# Patient Record
Sex: FEMALE | Race: WHITE | NOT HISPANIC OR LATINO | Employment: OTHER | URBAN - METROPOLITAN AREA
[De-identification: names, ages, dates, MRNs, and addresses within clinical notes are randomized per-mention and may not be internally consistent; named-entity substitution may affect disease eponyms.]

---

## 2017-01-01 ENCOUNTER — TRANSCRIBE ORDERS (OUTPATIENT)
Dept: ADMINISTRATIVE | Facility: HOSPITAL | Age: 72
End: 2017-01-01

## 2017-01-01 ENCOUNTER — OFFICE VISIT (OUTPATIENT)
Dept: CARDIOLOGY | Facility: CLINIC | Age: 72
End: 2017-01-01

## 2017-01-01 ENCOUNTER — HOSPITAL ENCOUNTER (OUTPATIENT)
Dept: CARDIOLOGY | Facility: HOSPITAL | Age: 72
Discharge: HOME | End: 2017-04-25
Admitting: FAMILY MEDICINE

## 2017-01-01 VITALS
HEIGHT: 63 IN | HEART RATE: 64 BPM | BODY MASS INDEX: 37.21 KG/M2 | SYSTOLIC BLOOD PRESSURE: 116 MMHG | DIASTOLIC BLOOD PRESSURE: 68 MMHG | WEIGHT: 210 LBS

## 2017-01-01 DIAGNOSIS — I63.40 CEREBROVASCULAR ACCIDENT (CVA) DUE TO EMBOLISM OF CEREBRAL ARTERY (HCC): Primary | ICD-10-CM

## 2017-01-01 DIAGNOSIS — M79.89 LEFT LEG SWELLING: ICD-10-CM

## 2017-01-01 DIAGNOSIS — M79.89 LEFT LEG SWELLING: Primary | ICD-10-CM

## 2017-01-01 LAB
BH CV LOWER VASCULAR LEFT COMMON FEMORAL AUGMENT: NORMAL
BH CV LOWER VASCULAR LEFT COMMON FEMORAL COMPETENT: NORMAL
BH CV LOWER VASCULAR LEFT COMMON FEMORAL COMPRESS: NORMAL
BH CV LOWER VASCULAR LEFT COMMON FEMORAL PHASIC: NORMAL
BH CV LOWER VASCULAR LEFT COMMON FEMORAL SPONT: NORMAL
BH CV LOWER VASCULAR LEFT DISTAL FEMORAL COMPRESS: NORMAL
BH CV LOWER VASCULAR LEFT GASTRONEMIUS COMPRESS: NORMAL
BH CV LOWER VASCULAR LEFT GREATER SAPH AK COMPRESS: NORMAL
BH CV LOWER VASCULAR LEFT GREATER SAPH BK COMPRESS: NORMAL
BH CV LOWER VASCULAR LEFT MID FEMORAL AUGMENT: NORMAL
BH CV LOWER VASCULAR LEFT MID FEMORAL COMPETENT: NORMAL
BH CV LOWER VASCULAR LEFT MID FEMORAL COMPRESS: NORMAL
BH CV LOWER VASCULAR LEFT MID FEMORAL PHASIC: NORMAL
BH CV LOWER VASCULAR LEFT MID FEMORAL SPONT: NORMAL
BH CV LOWER VASCULAR LEFT PERONEAL COMPRESS: NORMAL
BH CV LOWER VASCULAR LEFT POPLITEAL AUGMENT: NORMAL
BH CV LOWER VASCULAR LEFT POPLITEAL COMPETENT: NORMAL
BH CV LOWER VASCULAR LEFT POPLITEAL COMPRESS: NORMAL
BH CV LOWER VASCULAR LEFT POPLITEAL PHASIC: NORMAL
BH CV LOWER VASCULAR LEFT POPLITEAL SPONT: NORMAL
BH CV LOWER VASCULAR LEFT POSTERIOR TIBIAL COMPRESS: NORMAL
BH CV LOWER VASCULAR LEFT PROXIMAL FEMORAL COMPRESS: NORMAL
BH CV LOWER VASCULAR LEFT SAPHENOFEMORAL JUNCTION AUGMENT: NORMAL
BH CV LOWER VASCULAR LEFT SAPHENOFEMORAL JUNCTION COMPETENT: NORMAL
BH CV LOWER VASCULAR LEFT SAPHENOFEMORAL JUNCTION COMPRESS: NORMAL
BH CV LOWER VASCULAR LEFT SAPHENOFEMORAL JUNCTION PHASIC: NORMAL
BH CV LOWER VASCULAR LEFT SAPHENOFEMORAL JUNCTION SPONT: NORMAL
BH CV LOWER VASCULAR RIGHT COMMON FEMORAL AUGMENT: NORMAL
BH CV LOWER VASCULAR RIGHT COMMON FEMORAL COMPETENT: NORMAL
BH CV LOWER VASCULAR RIGHT COMMON FEMORAL COMPRESS: NORMAL
BH CV LOWER VASCULAR RIGHT COMMON FEMORAL PHASIC: NORMAL
BH CV LOWER VASCULAR RIGHT COMMON FEMORAL SPONT: NORMAL

## 2017-01-01 PROCEDURE — 93000 ELECTROCARDIOGRAM COMPLETE: CPT | Performed by: INTERNAL MEDICINE

## 2017-01-01 PROCEDURE — 99213 OFFICE O/P EST LOW 20 MIN: CPT | Performed by: INTERNAL MEDICINE

## 2017-01-01 PROCEDURE — 93971 EXTREMITY STUDY: CPT

## 2017-01-01 RX ORDER — PROMETHAZINE HYDROCHLORIDE 25 MG/1
25 TABLET ORAL AS NEEDED
COMMUNITY

## 2017-01-01 RX ORDER — OXYCODONE AND ACETAMINOPHEN 7.5; 325 MG/1; MG/1
1-2 TABLET ORAL AS NEEDED
COMMUNITY
Start: 2015-06-27

## 2017-01-01 RX ORDER — GABAPENTIN 300 MG/1
600 CAPSULE ORAL
COMMUNITY
Start: 2017-01-01

## 2017-02-16 ENCOUNTER — TELEPHONE (OUTPATIENT)
Dept: CARDIOLOGY | Facility: CLINIC | Age: 72
End: 2017-02-16

## 2017-02-16 NOTE — TELEPHONE ENCOUNTER
Patient is needing to have 8 teeth extracted and would like to be sedated for this.  They are requesting a clearance that patient is ok to undergo this type of sedation in the office.    Please advise.  Thanks!

## 2017-07-30 NOTE — PROGRESS NOTES
Subjective:     Encounter Date:07/17/2017      Patient ID: Jess Barber is a 72 y.o. female.    Chief Complaint: stroke, leg swelling    History of Present Illness    Dear Dr. Turcios,    I had the pleasure of seeing Jess Barber in cardiac follow up today.  As you know well, she is a miriam 72-year-old woman with history of cryptogenic stroke.  She had a PFO identified as the cause of this and this was closed many years ago.  She had a LAP-BAND surgery years ago and has done well with that.    She comes in for 6 month follow-up.  She's been under a lot of stress due to her 's severe illness.    She had some left leg swelling.  She had 2 Doppler studies that were negative for DVT.  Despite diuretics she still has a little swelling.    Review of Systems   All other systems reviewed and are negative.        ECG 12 Lead  Date/Time: 7/30/2017 4:41 PM  Performed by: KENNY DURBIN  Authorized by: KENNY DURBIN   Comparison: compared with previous ECG   Similar to previous ECG  Rhythm: sinus rhythm  BPM: 64  Clinical impression: normal ECG               Objective:     Physical Exam   Constitutional: She is oriented to person, place, and time. She appears well-developed and well-nourished.   HENT:   Head: Normocephalic and atraumatic.   Neck: Normal range of motion. Neck supple.   Cardiovascular: Normal rate, regular rhythm and normal heart sounds.    Pulmonary/Chest: Effort normal and breath sounds normal.   Abdominal: Soft. Bowel sounds are normal.   Musculoskeletal: Normal range of motion.   Neurological: She is alert and oriented to person, place, and time.   Skin: Skin is warm and dry.   Psychiatric: She has a normal mood and affect. Her behavior is normal. Thought content normal.   Vitals reviewed.      Lab Review:       Assessment:          Diagnosis Plan   1. Cerebrovascular accident (CVA) due to embolism of cerebral artery     2. Left leg swelling            Plan:       It was a pleasure to see your  patient in cardiac follow-up today.  She is doing well from the cardiac standpoint.  She's had no recurrent neurologic events.  She has some minor lotion edema.  I have recommended leg elevation and compression hose.  She will see me again in 6 months or sooner if symptoms warrant.

## 2018-01-01 ENCOUNTER — APPOINTMENT (OUTPATIENT)
Dept: GENERAL RADIOLOGY | Facility: HOSPITAL | Age: 73
End: 2018-01-01

## 2018-01-01 ENCOUNTER — TRANSCRIBE ORDERS (OUTPATIENT)
Dept: ADMINISTRATIVE | Facility: HOSPITAL | Age: 73
End: 2018-01-01

## 2018-01-01 ENCOUNTER — HOSPITAL ENCOUNTER (OUTPATIENT)
Dept: GENERAL RADIOLOGY | Facility: HOSPITAL | Age: 73
Discharge: HOME | End: 2018-01-05

## 2018-01-01 ENCOUNTER — APPOINTMENT (OUTPATIENT)
Dept: GENERAL RADIOLOGY | Facility: HOSPITAL | Age: 73
End: 2018-01-01
Attending: INTERNAL MEDICINE

## 2018-01-01 ENCOUNTER — HOSPITAL ENCOUNTER (INPATIENT)
Facility: HOSPITAL | Age: 73
LOS: 2 days | End: 2018-03-14
Attending: EMERGENCY MEDICINE | Admitting: INTERNAL MEDICINE

## 2018-01-01 ENCOUNTER — APPOINTMENT (OUTPATIENT)
Dept: CARDIOLOGY | Facility: HOSPITAL | Age: 73
End: 2018-01-01
Attending: INTERNAL MEDICINE

## 2018-01-01 ENCOUNTER — APPOINTMENT (OUTPATIENT)
Dept: CT IMAGING | Facility: HOSPITAL | Age: 73
End: 2018-01-01

## 2018-01-01 ENCOUNTER — HOSPITAL ENCOUNTER (OUTPATIENT)
Dept: GENERAL RADIOLOGY | Facility: HOSPITAL | Age: 73
Discharge: HOME | End: 2018-01-31
Admitting: FAMILY MEDICINE

## 2018-01-01 ENCOUNTER — OFFICE VISIT (OUTPATIENT)
Dept: CARDIOLOGY | Facility: CLINIC | Age: 73
End: 2018-01-01

## 2018-01-01 ENCOUNTER — HOSPITAL ENCOUNTER (OUTPATIENT)
Dept: GENERAL RADIOLOGY | Facility: HOSPITAL | Age: 73
Discharge: HOME | End: 2018-01-31

## 2018-01-01 ENCOUNTER — HOSPITAL ENCOUNTER (OUTPATIENT)
Dept: GENERAL RADIOLOGY | Facility: HOSPITAL | Age: 73
Discharge: HOME | End: 2018-01-05
Admitting: FAMILY MEDICINE

## 2018-01-01 VITALS
HEIGHT: 63 IN | BODY MASS INDEX: 34.73 KG/M2 | WEIGHT: 196 LBS | DIASTOLIC BLOOD PRESSURE: 84 MMHG | HEART RATE: 76 BPM | RESPIRATION RATE: 18 BRPM | SYSTOLIC BLOOD PRESSURE: 162 MMHG

## 2018-01-01 VITALS — WEIGHT: 220.24 LBS | BODY MASS INDEX: 39.02 KG/M2 | TEMPERATURE: 100 F | HEIGHT: 63 IN

## 2018-01-01 DIAGNOSIS — Z91.81 PERSONAL HISTORY OF FALL: ICD-10-CM

## 2018-01-01 DIAGNOSIS — R41.82 ALTERED MENTAL STATUS, UNSPECIFIED ALTERED MENTAL STATUS TYPE: ICD-10-CM

## 2018-01-01 DIAGNOSIS — E16.2 HYPOGLYCEMIA: Primary | ICD-10-CM

## 2018-01-01 DIAGNOSIS — M79.672 LEFT FOOT PAIN: ICD-10-CM

## 2018-01-01 DIAGNOSIS — K72.00 ACUTE LIVER FAILURE WITHOUT HEPATIC COMA: ICD-10-CM

## 2018-01-01 DIAGNOSIS — A41.9 SEPTIC SHOCK (HCC): ICD-10-CM

## 2018-01-01 DIAGNOSIS — M25.572 LEFT ANKLE PAIN, UNSPECIFIED CHRONICITY: ICD-10-CM

## 2018-01-01 DIAGNOSIS — S92.302S CLOSED NONDISPLACED FRACTURE OF METATARSAL BONE OF LEFT FOOT, UNSPECIFIED METATARSAL, SEQUELA: ICD-10-CM

## 2018-01-01 DIAGNOSIS — S92.302S CLOSED NONDISPLACED FRACTURE OF METATARSAL BONE OF LEFT FOOT, UNSPECIFIED METATARSAL, SEQUELA: Primary | ICD-10-CM

## 2018-01-01 DIAGNOSIS — N28.9 RENAL INSUFFICIENCY: ICD-10-CM

## 2018-01-01 DIAGNOSIS — R65.21 SEPTIC SHOCK (HCC): ICD-10-CM

## 2018-01-01 DIAGNOSIS — R79.1 SUPRATHERAPEUTIC INTERNATIONAL NORMALIZED RATIO (INR): ICD-10-CM

## 2018-01-01 DIAGNOSIS — Q21.12 PFO (PATENT FORAMEN OVALE): Primary | ICD-10-CM

## 2018-01-01 DIAGNOSIS — Z91.81 PERSONAL HISTORY OF FALL: Primary | ICD-10-CM

## 2018-01-01 DIAGNOSIS — Z86.73 HISTORY OF STROKE: ICD-10-CM

## 2018-01-01 LAB
ABO + RH BLD: NORMAL
ABO + RH BLD: NORMAL
ABO GROUP BLD: NORMAL
ALBUMIN SERPL-MCNC: 1.8 G/DL (ref 3.5–5.2)
ALBUMIN SERPL-MCNC: 2.1 G/DL (ref 3.5–5.2)
ALBUMIN SERPL-MCNC: 2.2 G/DL (ref 3.5–5.2)
ALBUMIN SERPL-MCNC: 2.4 G/DL (ref 3.5–5.2)
ALBUMIN SERPL-MCNC: 2.4 G/DL (ref 3.5–5.2)
ALBUMIN SERPL-MCNC: 2.5 G/DL (ref 3.5–5.2)
ALBUMIN SERPL-MCNC: 2.7 G/DL (ref 3.5–5.2)
ALBUMIN SERPL-MCNC: 2.8 G/DL (ref 3.5–5.2)
ALBUMIN SERPL-MCNC: 3 G/DL (ref 3.5–5.2)
ALBUMIN/GLOB SERPL: 0.7 G/DL
ALBUMIN/GLOB SERPL: 1 G/DL
ALBUMIN/GLOB SERPL: 1.1 G/DL
ALBUMIN/GLOB SERPL: 1.1 G/DL
ALP SERPL-CCNC: 210 U/L (ref 39–117)
ALP SERPL-CCNC: 255 U/L (ref 39–117)
ALP SERPL-CCNC: 267 U/L (ref 39–117)
ALP SERPL-CCNC: 278 U/L (ref 39–117)
ALT SERPL W P-5'-P-CCNC: 1682 U/L (ref 1–33)
ALT SERPL W P-5'-P-CCNC: 2319 U/L (ref 1–33)
ALT SERPL W P-5'-P-CCNC: 2870 U/L (ref 1–33)
ALT SERPL W P-5'-P-CCNC: 3690 U/L (ref 1–33)
ALT SERPL W P-5'-P-CCNC: 5214 U/L (ref 1–33)
AMMONIA BLD-SCNC: 207 UMOL/L (ref 11–51)
AMPHET+METHAMPHET UR QL: POSITIVE
ANION GAP SERPL CALCULATED.3IONS-SCNC: 23.4 MMOL/L
ANION GAP SERPL CALCULATED.3IONS-SCNC: 24.4 MMOL/L
ANION GAP SERPL CALCULATED.3IONS-SCNC: 24.6 MMOL/L
ANION GAP SERPL CALCULATED.3IONS-SCNC: 25 MMOL/L
ANION GAP SERPL CALCULATED.3IONS-SCNC: 26.1 MMOL/L
ANION GAP SERPL CALCULATED.3IONS-SCNC: 27.2 MMOL/L
ANION GAP SERPL CALCULATED.3IONS-SCNC: 31.8 MMOL/L
ANION GAP SERPL CALCULATED.3IONS-SCNC: 36.8 MMOL/L
ANION GAP SERPL CALCULATED.3IONS-SCNC: 38.5 MMOL/L
ANISOCYTOSIS BLD QL: ABNORMAL
APAP SERPL-MCNC: <5 MCG/ML (ref 10–30)
APAP SERPL-MCNC: <5 MCG/ML (ref 10–30)
ARTERIAL PATENCY WRIST A: ABNORMAL
ARTERIAL PATENCY WRIST A: POSITIVE
ASCENDING AORTA: 1.9 CM
AST SERPL-CCNC: 1869 U/L (ref 1–32)
AST SERPL-CCNC: 3024 U/L (ref 1–32)
AST SERPL-CCNC: 5612 U/L (ref 1–32)
AST SERPL-CCNC: 774 U/L (ref 1–32)
AST SERPL-CCNC: >7000 U/L (ref 1–32)
ATMOSPHERIC PRESS: 751.3 MMHG
ATMOSPHERIC PRESS: 751.4 MMHG
ATMOSPHERIC PRESS: 752.9 MMHG
ATMOSPHERIC PRESS: 753.6 MMHG
ATMOSPHERIC PRESS: 755.6 MMHG
B PERT DNA SPEC QL NAA+PROBE: NOT DETECTED
BACTERIA BLD CULT: ABNORMAL
BACTERIA SPEC AEROBE CULT: ABNORMAL
BACTERIA SPEC AEROBE CULT: ABNORMAL
BACTERIA UR QL AUTO: ABNORMAL /HPF
BARBITURATES UR QL SCN: NEGATIVE
BASE EXCESS BLDA CALC-SCNC: -1.7 MMOL/L (ref 0–2)
BASE EXCESS BLDA CALC-SCNC: -18.2 MMOL/L (ref 0–2)
BASE EXCESS BLDA CALC-SCNC: -18.5 MMOL/L (ref 0–2)
BASE EXCESS BLDA CALC-SCNC: -3.2 MMOL/L (ref 0–2)
BASE EXCESS BLDA CALC-SCNC: 0.4 MMOL/L (ref 0–2)
BDY SITE: ABNORMAL
BENZODIAZ UR QL SCN: NEGATIVE
BH BB BLOOD EXPIRATION DATE: NORMAL
BH BB BLOOD EXPIRATION DATE: NORMAL
BH BB BLOOD TYPE BARCODE: 5100
BH BB BLOOD TYPE BARCODE: 5100
BH BB DISPENSE STATUS: NORMAL
BH BB DISPENSE STATUS: NORMAL
BH BB PRODUCT CODE: NORMAL
BH BB PRODUCT CODE: NORMAL
BH BB UNIT NUMBER: NORMAL
BH BB UNIT NUMBER: NORMAL
BH CV ECHO MEAS - ACS: 1.6 CM
BH CV ECHO MEAS - AO MEAN PG (FULL): 1 MMHG
BH CV ECHO MEAS - AO MEAN PG: 4 MMHG
BH CV ECHO MEAS - AO ROOT AREA (BSA CORRECTED): 1.1
BH CV ECHO MEAS - AO ROOT AREA: 3.8 CM^2
BH CV ECHO MEAS - AO ROOT DIAM: 2.2 CM
BH CV ECHO MEAS - AO V2 MEAN: 95.5 CM/SEC
BH CV ECHO MEAS - AO V2 VTI: 22.1 CM
BH CV ECHO MEAS - ASC AORTA: 1.9 CM
BH CV ECHO MEAS - AVA(I,A): 2.1 CM^2
BH CV ECHO MEAS - AVA(I,D): 2.1 CM^2
BH CV ECHO MEAS - BSA(HAYCOCK): 2.1 M^2
BH CV ECHO MEAS - BSA: 2 M^2
BH CV ECHO MEAS - BZI_BMI: 37 KILOGRAMS/M^2
BH CV ECHO MEAS - BZI_METRIC_HEIGHT: 160 CM
BH CV ECHO MEAS - BZI_METRIC_WEIGHT: 94.8 KG
BH CV ECHO MEAS - CONTRAST EF (2CH): 66.7 ML/M^2
BH CV ECHO MEAS - CONTRAST EF 4CH: 67.1 ML/M^2
BH CV ECHO MEAS - EDV(CUBED): 74.1 ML
BH CV ECHO MEAS - EDV(MOD-SP2): 48 ML
BH CV ECHO MEAS - EDV(MOD-SP4): 70 ML
BH CV ECHO MEAS - EDV(TEICH): 78.6 ML
BH CV ECHO MEAS - EF(CUBED): 70.4 %
BH CV ECHO MEAS - EF(MOD-SP2): 66.7 %
BH CV ECHO MEAS - EF(MOD-SP4): 67.1 %
BH CV ECHO MEAS - EF(TEICH): 62.4 %
BH CV ECHO MEAS - ESV(CUBED): 22 ML
BH CV ECHO MEAS - ESV(MOD-SP2): 16 ML
BH CV ECHO MEAS - ESV(MOD-SP4): 23 ML
BH CV ECHO MEAS - ESV(TEICH): 29.6 ML
BH CV ECHO MEAS - FS: 33.3 %
BH CV ECHO MEAS - IVS/LVPW: 1.1
BH CV ECHO MEAS - IVSD: 1.1 CM
BH CV ECHO MEAS - LAT PEAK E' VEL: 5 CM/SEC
BH CV ECHO MEAS - LV DIASTOLIC VOL/BSA (35-75): 35.5 ML/M^2
BH CV ECHO MEAS - LV MASS(C)D: 147 GRAMS
BH CV ECHO MEAS - LV MASS(C)DI: 74.6 GRAMS/M^2
BH CV ECHO MEAS - LV MEAN PG: 3 MMHG
BH CV ECHO MEAS - LV SYSTOLIC VOL/BSA (12-30): 11.7 ML/M^2
BH CV ECHO MEAS - LV V1 MEAN: 81.2 CM/SEC
BH CV ECHO MEAS - LV V1 VTI: 20.2 CM
BH CV ECHO MEAS - LVIDD: 4.2 CM
BH CV ECHO MEAS - LVIDS: 2.8 CM
BH CV ECHO MEAS - LVLD AP2: 6.8 CM
BH CV ECHO MEAS - LVLD AP4: 7.4 CM
BH CV ECHO MEAS - LVLS AP2: 5.4 CM
BH CV ECHO MEAS - LVLS AP4: 6.1 CM
BH CV ECHO MEAS - LVOT AREA (M): 2.3 CM^2
BH CV ECHO MEAS - LVOT AREA: 2.3 CM^2
BH CV ECHO MEAS - LVOT DIAM: 1.7 CM
BH CV ECHO MEAS - LVPWD: 1 CM
BH CV ECHO MEAS - MED PEAK E' VEL: 4 CM/SEC
BH CV ECHO MEAS - MV A DUR: 0.09 SEC
BH CV ECHO MEAS - MV A MAX VEL: 107 CM/SEC
BH CV ECHO MEAS - MV DEC SLOPE: 651 CM/SEC^2
BH CV ECHO MEAS - MV DEC TIME: 0.09 SEC
BH CV ECHO MEAS - MV E MAX VEL: 75.5 CM/SEC
BH CV ECHO MEAS - MV E/A: 0.71
BH CV ECHO MEAS - MV MEAN PG: 3 MMHG
BH CV ECHO MEAS - MV P1/2T MAX VEL: 128 CM/SEC
BH CV ECHO MEAS - MV P1/2T: 57.6 MSEC
BH CV ECHO MEAS - MV V2 MEAN: 71.8 CM/SEC
BH CV ECHO MEAS - MV V2 VTI: 20.1 CM
BH CV ECHO MEAS - MVA P1/2T LCG: 1.7 CM^2
BH CV ECHO MEAS - MVA(P1/2T): 3.8 CM^2
BH CV ECHO MEAS - MVA(VTI): 2.3 CM^2
BH CV ECHO MEAS - PA ACC SLOPE: 19.3 CM/SEC^2
BH CV ECHO MEAS - PA ACC TIME: 0.07 SEC
BH CV ECHO MEAS - PA MAX PG: 5.1 MMHG
BH CV ECHO MEAS - PA PR(ACCEL): 47.5 MMHG
BH CV ECHO MEAS - PA V2 MAX: 113 CM/SEC
BH CV ECHO MEAS - PULM A REVS DUR: 0.12 SEC
BH CV ECHO MEAS - PULM A REVS VEL: 45.9 CM/SEC
BH CV ECHO MEAS - QP/QS: 0.23
BH CV ECHO MEAS - RV MEAN PG: 2 MMHG
BH CV ECHO MEAS - RV V1 MEAN: 54 CM/SEC
BH CV ECHO MEAS - RV V1 VTI: 11 CM
BH CV ECHO MEAS - RVOT AREA: 0.95 CM^2
BH CV ECHO MEAS - RVOT DIAM: 1.1 CM
BH CV ECHO MEAS - SI(AO): 42.6 ML/M^2
BH CV ECHO MEAS - SI(CUBED): 26.5 ML/M^2
BH CV ECHO MEAS - SI(LVOT): 23.3 ML/M^2
BH CV ECHO MEAS - SI(MOD-SP2): 16.2 ML/M^2
BH CV ECHO MEAS - SI(MOD-SP4): 23.9 ML/M^2
BH CV ECHO MEAS - SI(TEICH): 24.9 ML/M^2
BH CV ECHO MEAS - SUP REN AO DIAM: 1.71 CM
BH CV ECHO MEAS - SV(AO): 84 ML
BH CV ECHO MEAS - SV(CUBED): 52.1 ML
BH CV ECHO MEAS - SV(LVOT): 45.9 ML
BH CV ECHO MEAS - SV(MOD-SP2): 32 ML
BH CV ECHO MEAS - SV(MOD-SP4): 47 ML
BH CV ECHO MEAS - SV(RVOT): 10.5 ML
BH CV ECHO MEAS - SV(TEICH): 49 ML
BH CV ECHO MEAS - TAPSE (>1.6): 2.5 CM2
BH CV XLRA - RV BASE: 4.2 CM
BH CV XLRA - RV LENGTH: 6.6 CM
BH CV XLRA - RV MID: 3.9 CM
BH CV XLRA - TDI S': 13 CM/SEC
BILIRUB SERPL-MCNC: 3.5 MG/DL (ref 0.1–1.2)
BILIRUB SERPL-MCNC: 4.5 MG/DL (ref 0.1–1.2)
BILIRUB SERPL-MCNC: 5.5 MG/DL (ref 0.1–1.2)
BILIRUB SERPL-MCNC: 8.1 MG/DL (ref 0.1–1.2)
BILIRUB UR QL STRIP: NEGATIVE
BLD GP AB SCN SERPL QL: NEGATIVE
BUN BLD-MCNC: 11 MG/DL (ref 8–23)
BUN BLD-MCNC: 11 MG/DL (ref 8–23)
BUN BLD-MCNC: 13 MG/DL (ref 8–23)
BUN BLD-MCNC: 14 MG/DL (ref 8–23)
BUN BLD-MCNC: 15 MG/DL (ref 8–23)
BUN BLD-MCNC: 21 MG/DL (ref 8–23)
BUN BLD-MCNC: 23 MG/DL (ref 8–23)
BUN BLD-MCNC: 24 MG/DL (ref 8–23)
BUN BLD-MCNC: 9 MG/DL (ref 8–23)
BUN/CREAT SERPL: 5.2 (ref 7–25)
BUN/CREAT SERPL: 5.4 (ref 7–25)
BUN/CREAT SERPL: 5.9 (ref 7–25)
BUN/CREAT SERPL: 6 (ref 7–25)
BUN/CREAT SERPL: 6 (ref 7–25)
BUN/CREAT SERPL: 6.1 (ref 7–25)
BUN/CREAT SERPL: 6.2 (ref 7–25)
BURR CELLS BLD QL SMEAR: ABNORMAL
C PNEUM DNA NPH QL NAA+NON-PROBE: NOT DETECTED
CA-I BLD-MCNC: 3.2 MG/DL (ref 4.6–5.4)
CA-I BLD-MCNC: 3.6 MG/DL (ref 4.6–5.4)
CA-I BLD-MCNC: 3.8 MG/DL (ref 4.6–5.4)
CA-I BLD-MCNC: 4 MG/DL (ref 4.6–5.4)
CA-I BLD-MCNC: 4.3 MG/DL (ref 4.6–5.4)
CA-I BLD-MCNC: 4.6 MG/DL (ref 4.6–5.4)
CA-I SERPL ISE-MCNC: 0.79 MMOL/L (ref 1.15–1.35)
CA-I SERPL ISE-MCNC: 0.89 MMOL/L (ref 1.15–1.35)
CA-I SERPL ISE-MCNC: 0.96 MMOL/L (ref 1.15–1.35)
CA-I SERPL ISE-MCNC: 1 MMOL/L (ref 1.15–1.35)
CA-I SERPL ISE-MCNC: 1.07 MMOL/L (ref 1.15–1.35)
CA-I SERPL ISE-MCNC: 1.14 MMOL/L (ref 1.15–1.35)
CALCIUM SPEC-SCNC: 6 MG/DL (ref 8.6–10.5)
CALCIUM SPEC-SCNC: 6.1 MG/DL (ref 8.6–10.5)
CALCIUM SPEC-SCNC: 6.8 MG/DL (ref 8.6–10.5)
CALCIUM SPEC-SCNC: 6.8 MG/DL (ref 8.6–10.5)
CALCIUM SPEC-SCNC: 7.3 MG/DL (ref 8.6–10.5)
CALCIUM SPEC-SCNC: 7.4 MG/DL (ref 8.6–10.5)
CALCIUM SPEC-SCNC: 7.4 MG/DL (ref 8.6–10.5)
CALCIUM SPEC-SCNC: 8 MG/DL (ref 8.6–10.5)
CALCIUM SPEC-SCNC: 8.4 MG/DL (ref 8.6–10.5)
CANNABINOIDS SERPL QL: NEGATIVE
CHLORIDE SERPL-SCNC: 107 MMOL/L (ref 98–107)
CHLORIDE SERPL-SCNC: 96 MMOL/L (ref 98–107)
CHLORIDE SERPL-SCNC: 97 MMOL/L (ref 98–107)
CHLORIDE SERPL-SCNC: 98 MMOL/L (ref 98–107)
CHLORIDE SERPL-SCNC: 98 MMOL/L (ref 98–107)
CHLORIDE SERPL-SCNC: 99 MMOL/L (ref 98–107)
CK MB SERPL-CCNC: 96.83 NG/ML
CK SERPL-CCNC: 1029 U/L (ref 20–180)
CK SERPL-CCNC: 1807 U/L (ref 20–180)
CK SERPL-CCNC: 6654 U/L (ref 20–180)
CLARITY UR: ABNORMAL
CO2 SERPL-SCNC: 10.2 MMOL/L (ref 22–29)
CO2 SERPL-SCNC: 12.2 MMOL/L (ref 22–29)
CO2 SERPL-SCNC: 18.6 MMOL/L (ref 22–29)
CO2 SERPL-SCNC: 19 MMOL/L (ref 22–29)
CO2 SERPL-SCNC: 20.4 MMOL/L (ref 22–29)
CO2 SERPL-SCNC: 21.6 MMOL/L (ref 22–29)
CO2 SERPL-SCNC: 21.8 MMOL/L (ref 22–29)
CO2 SERPL-SCNC: 23.9 MMOL/L (ref 22–29)
CO2 SERPL-SCNC: 9.5 MMOL/L (ref 22–29)
COCAINE UR QL: NEGATIVE
COLOR UR: ABNORMAL
CREAT BLD-MCNC: 1.53 MG/DL (ref 0.57–1)
CREAT BLD-MCNC: 1.8 MG/DL (ref 0.57–1)
CREAT BLD-MCNC: 1.81 MG/DL (ref 0.57–1)
CREAT BLD-MCNC: 2.13 MG/DL (ref 0.57–1)
CREAT BLD-MCNC: 2.33 MG/DL (ref 0.57–1)
CREAT BLD-MCNC: 2.51 MG/DL (ref 0.57–1)
CREAT BLD-MCNC: 3.87 MG/DL (ref 0.57–1)
CREAT BLD-MCNC: 4.05 MG/DL (ref 0.57–1)
CREAT BLD-MCNC: 4.26 MG/DL (ref 0.57–1)
CREAT UR-MCNC: 92.6 MG/DL
D-LACTATE SERPL-SCNC: 13.6 MMOL/L (ref 0.5–2)
D-LACTATE SERPL-SCNC: 14.7 MMOL/L (ref 0.5–2)
D-LACTATE SERPL-SCNC: 8.8 MMOL/L (ref 0.5–2)
D-LACTATE SERPL-SCNC: 9.2 MMOL/L (ref 0.5–2)
DEPRECATED RDW RBC AUTO: 53.7 FL (ref 37–54)
DEPRECATED RDW RBC AUTO: 55.4 FL (ref 37–54)
DEPRECATED RDW RBC AUTO: 56.2 FL (ref 37–54)
E/E' RATIO: 11
ERYTHROCYTE [DISTWIDTH] IN BLOOD BY AUTOMATED COUNT: 16.7 % (ref 11.7–13)
ERYTHROCYTE [DISTWIDTH] IN BLOOD BY AUTOMATED COUNT: 16.7 % (ref 11.7–13)
ERYTHROCYTE [DISTWIDTH] IN BLOOD BY AUTOMATED COUNT: 17.3 % (ref 11.7–13)
FLUAV H1 2009 PAND RNA NPH QL NAA+PROBE: NOT DETECTED
FLUAV H1 HA GENE NPH QL NAA+PROBE: NOT DETECTED
FLUAV H3 RNA NPH QL NAA+PROBE: NOT DETECTED
FLUAV SUBTYP SPEC NAA+PROBE: NOT DETECTED
FLUBV RNA ISLT QL NAA+PROBE: NOT DETECTED
GFR SERPL CREATININE-BSD FRML MDRD: 10 ML/MIN/1.73
GFR SERPL CREATININE-BSD FRML MDRD: 11 ML/MIN/1.73
GFR SERPL CREATININE-BSD FRML MDRD: 11 ML/MIN/1.73
GFR SERPL CREATININE-BSD FRML MDRD: 19 ML/MIN/1.73
GFR SERPL CREATININE-BSD FRML MDRD: 20 ML/MIN/1.73
GFR SERPL CREATININE-BSD FRML MDRD: 23 ML/MIN/1.73
GFR SERPL CREATININE-BSD FRML MDRD: 27 ML/MIN/1.73
GFR SERPL CREATININE-BSD FRML MDRD: 28 ML/MIN/1.73
GFR SERPL CREATININE-BSD FRML MDRD: 33 ML/MIN/1.73
GLOBULIN UR ELPH-MCNC: 2.2 GM/DL
GLOBULIN UR ELPH-MCNC: 2.4 GM/DL
GLOBULIN UR ELPH-MCNC: 2.7 GM/DL
GLOBULIN UR ELPH-MCNC: 2.8 GM/DL
GLUCOSE BLD-MCNC: 113 MG/DL (ref 65–99)
GLUCOSE BLD-MCNC: 138 MG/DL (ref 65–99)
GLUCOSE BLD-MCNC: 143 MG/DL (ref 65–99)
GLUCOSE BLD-MCNC: 152 MG/DL (ref 65–99)
GLUCOSE BLD-MCNC: 256 MG/DL (ref 65–99)
GLUCOSE BLD-MCNC: 266 MG/DL (ref 65–99)
GLUCOSE BLD-MCNC: 82 MG/DL (ref 65–99)
GLUCOSE BLD-MCNC: 84 MG/DL (ref 65–99)
GLUCOSE BLD-MCNC: 96 MG/DL (ref 65–99)
GLUCOSE BLDC GLUCOMTR-MCNC: 102 MG/DL (ref 70–130)
GLUCOSE BLDC GLUCOMTR-MCNC: 103 MG/DL (ref 70–130)
GLUCOSE BLDC GLUCOMTR-MCNC: 110 MG/DL (ref 70–130)
GLUCOSE BLDC GLUCOMTR-MCNC: 137 MG/DL (ref 70–130)
GLUCOSE BLDC GLUCOMTR-MCNC: 146 MG/DL (ref 70–130)
GLUCOSE BLDC GLUCOMTR-MCNC: 172 MG/DL (ref 70–130)
GLUCOSE BLDC GLUCOMTR-MCNC: 201 MG/DL (ref 70–130)
GLUCOSE BLDC GLUCOMTR-MCNC: 263 MG/DL (ref 70–130)
GLUCOSE BLDC GLUCOMTR-MCNC: 33 MG/DL (ref 70–130)
GLUCOSE BLDC GLUCOMTR-MCNC: 41 MG/DL (ref 70–130)
GLUCOSE BLDC GLUCOMTR-MCNC: 73 MG/DL (ref 70–130)
GLUCOSE BLDC GLUCOMTR-MCNC: 88 MG/DL (ref 70–130)
GLUCOSE BLDC GLUCOMTR-MCNC: 90 MG/DL (ref 70–130)
GLUCOSE BLDC GLUCOMTR-MCNC: 91 MG/DL (ref 70–130)
GLUCOSE BLDC GLUCOMTR-MCNC: 92 MG/DL (ref 70–130)
GLUCOSE BLDC GLUCOMTR-MCNC: 94 MG/DL (ref 70–130)
GLUCOSE UR STRIP-MCNC: NEGATIVE MG/DL
GRAM STN SPEC: ABNORMAL
HADV DNA SPEC NAA+PROBE: NOT DETECTED
HAV IGM SERPL QL IA: NORMAL
HBA1C MFR BLD: 5.2 % (ref 4.8–5.6)
HBV CORE IGM SERPL QL IA: NORMAL
HBV SURFACE AG SERPL QL IA: NORMAL
HCO3 BLDA-SCNC: 19.1 MMOL/L (ref 22–28)
HCO3 BLDA-SCNC: 19.6 MMOL/L (ref 22–28)
HCO3 BLDA-SCNC: 21.8 MMOL/L (ref 22–28)
HCO3 BLDA-SCNC: 6.1 MMOL/L (ref 22–28)
HCO3 BLDA-SCNC: 9.4 MMOL/L (ref 22–28)
HCOV 229E RNA SPEC QL NAA+PROBE: NOT DETECTED
HCOV HKU1 RNA SPEC QL NAA+PROBE: NOT DETECTED
HCOV NL63 RNA SPEC QL NAA+PROBE: NOT DETECTED
HCOV OC43 RNA SPEC QL NAA+PROBE: NOT DETECTED
HCT VFR BLD AUTO: 29.2 % (ref 35.6–45.5)
HCT VFR BLD AUTO: 32.5 % (ref 35.6–45.5)
HCT VFR BLD AUTO: 37.6 % (ref 35.6–45.5)
HCV AB SER DONR QL: NORMAL
HGB BLD-MCNC: 10.9 G/DL (ref 11.9–15.5)
HGB BLD-MCNC: 12.3 G/DL (ref 11.9–15.5)
HGB BLD-MCNC: 9.9 G/DL (ref 11.9–15.5)
HGB UR QL STRIP.AUTO: NEGATIVE
HMPV RNA NPH QL NAA+NON-PROBE: NOT DETECTED
HOLD SPECIMEN: NORMAL
HOROWITZ INDEX BLD+IHG-RTO: 100 %
HOROWITZ INDEX BLD+IHG-RTO: 40 %
HOROWITZ INDEX BLD+IHG-RTO: 60 %
HOROWITZ INDEX BLD+IHG-RTO: 60 %
HPIV1 RNA SPEC QL NAA+PROBE: NOT DETECTED
HPIV2 RNA SPEC QL NAA+PROBE: NOT DETECTED
HPIV3 RNA NPH QL NAA+PROBE: NOT DETECTED
HPIV4 P GENE NPH QL NAA+PROBE: NOT DETECTED
HYALINE CASTS UR QL AUTO: ABNORMAL /LPF
INR PPP: 2.9 (ref 0.9–1.1)
INR PPP: 3.09 (ref 0.9–1.1)
INR PPP: 3.28 (ref 0.9–1.1)
INR PPP: 6.76 (ref 0.9–1.1)
ISOLATED FROM: ABNORMAL
KETONES UR QL STRIP: NEGATIVE
LEFT ATRIUM VOLUME INDEX: 10 ML/M2
LEUKOCYTE ESTERASE UR QL STRIP.AUTO: ABNORMAL
LYMPHOCYTES # BLD MANUAL: 2.16 10*3/MM3 (ref 0.9–4.8)
LYMPHOCYTES NFR BLD MANUAL: 5 % (ref 5–12)
LYMPHOCYTES NFR BLD MANUAL: 8 % (ref 19.6–45.3)
M PNEUMO IGG SER IA-ACNC: NOT DETECTED
MAGNESIUM SERPL-MCNC: 1.7 MG/DL (ref 1.6–2.4)
MAGNESIUM SERPL-MCNC: 1.8 MG/DL (ref 1.6–2.4)
MAGNESIUM SERPL-MCNC: 1.9 MG/DL (ref 1.6–2.4)
MAGNESIUM SERPL-MCNC: 2.2 MG/DL (ref 1.6–2.4)
MAXIMAL PREDICTED HEART RATE: 146 BPM
MCH RBC QN AUTO: 30.5 PG (ref 26.9–32)
MCH RBC QN AUTO: 30.8 PG (ref 26.9–32)
MCH RBC QN AUTO: 31.1 PG (ref 26.9–32)
MCHC RBC AUTO-ENTMCNC: 32.7 G/DL (ref 32.4–36.3)
MCHC RBC AUTO-ENTMCNC: 33.5 G/DL (ref 32.4–36.3)
MCHC RBC AUTO-ENTMCNC: 33.9 G/DL (ref 32.4–36.3)
MCV RBC AUTO: 91 FL (ref 80.5–98.2)
MCV RBC AUTO: 91.8 FL (ref 80.5–98.2)
MCV RBC AUTO: 94 FL (ref 80.5–98.2)
METHADONE UR QL SCN: NEGATIVE
MODALITY: ABNORMAL
MONOCYTES # BLD AUTO: 1.35 10*3/MM3 (ref 0.2–1.2)
NEUTROPHILS # BLD AUTO: 23.46 10*3/MM3 (ref 1.9–8.1)
NEUTROPHILS NFR BLD MANUAL: 87 % (ref 42.7–76)
NEUTS VAC BLD QL SMEAR: ABNORMAL
NITRITE UR QL STRIP: NEGATIVE
O2 A-A PPRESDIFF RESPIRATORY: 0.2 MMHG
O2 A-A PPRESDIFF RESPIRATORY: 0.3 MMHG
OPIATES UR QL: POSITIVE
OXYCODONE UR QL SCN: POSITIVE
PCO2 BLDA: 20.2 MM HG (ref 35–45)
PCO2 BLDA: 24.1 MM HG (ref 35–45)
PCO2 BLDA: 27.4 MM HG (ref 35–45)
PCO2 BLDA: 27.6 MM HG (ref 35–45)
PCO2 BLDA: <14.4 MM HG (ref 35–45)
PEEP RESPIRATORY: 5 CM[H2O]
PH BLDA: 7.14 PH UNITS (ref 7.35–7.45)
PH BLDA: 7.26 PH UNITS (ref 7.35–7.45)
PH BLDA: 7.46 PH UNITS (ref 7.35–7.45)
PH BLDA: 7.57 PH UNITS (ref 7.35–7.45)
PH BLDA: 7.59 PH UNITS (ref 7.35–7.45)
PH UR STRIP.AUTO: 5.5 [PH] (ref 5–8)
PHOSPHATE SERPL-MCNC: 2 MG/DL (ref 2.5–4.5)
PHOSPHATE SERPL-MCNC: 2.1 MG/DL (ref 2.5–4.5)
PHOSPHATE SERPL-MCNC: 3 MG/DL (ref 2.5–4.5)
PHOSPHATE SERPL-MCNC: 3.3 MG/DL (ref 2.5–4.5)
PHOSPHATE SERPL-MCNC: 5.5 MG/DL (ref 2.5–4.5)
PHOSPHATE SERPL-MCNC: 6.3 MG/DL (ref 2.5–4.5)
PLAT MORPH BLD: NORMAL
PLATELET # BLD AUTO: 138 10*3/MM3 (ref 140–500)
PLATELET # BLD AUTO: 227 10*3/MM3 (ref 140–500)
PLATELET # BLD AUTO: 451 10*3/MM3 (ref 140–500)
PMV BLD AUTO: 9.1 FL (ref 6–12)
PMV BLD AUTO: 9.2 FL (ref 6–12)
PMV BLD AUTO: 9.7 FL (ref 6–12)
PO2 BLDA: 110.6 MM HG (ref 80–100)
PO2 BLDA: 193.6 MM HG (ref 80–100)
PO2 BLDA: 77.2 MM HG (ref 80–100)
PO2 BLDA: 77.8 MM HG (ref 80–100)
PO2 BLDA: 92.1 MM HG (ref 80–100)
POTASSIUM BLD-SCNC: 2.8 MMOL/L (ref 3.5–5.2)
POTASSIUM BLD-SCNC: 2.8 MMOL/L (ref 3.5–5.2)
POTASSIUM BLD-SCNC: 2.9 MMOL/L (ref 3.5–5.2)
POTASSIUM BLD-SCNC: 3.2 MMOL/L (ref 3.5–5.2)
POTASSIUM BLD-SCNC: 3.4 MMOL/L (ref 3.5–5.2)
POTASSIUM BLD-SCNC: 3.6 MMOL/L (ref 3.5–5.2)
POTASSIUM BLD-SCNC: 3.8 MMOL/L (ref 3.5–5.2)
PROCALCITONIN SERPL-MCNC: 28.52 NG/ML (ref 0.1–0.25)
PROT SERPL-MCNC: 4.4 G/DL (ref 6–8.5)
PROT SERPL-MCNC: 4.5 G/DL (ref 6–8.5)
PROT SERPL-MCNC: 5.1 G/DL (ref 6–8.5)
PROT SERPL-MCNC: 5.8 G/DL (ref 6–8.5)
PROT UR QL STRIP: ABNORMAL
PROTHROMBIN TIME: 29.9 SECONDS (ref 11.7–14.2)
PROTHROMBIN TIME: 31.4 SECONDS (ref 11.7–14.2)
PROTHROMBIN TIME: 32.9 SECONDS (ref 11.7–14.2)
PROTHROMBIN TIME: 57.8 SECONDS (ref 11.7–14.2)
RBC # BLD AUTO: 3.18 10*6/MM3 (ref 3.9–5.2)
RBC # BLD AUTO: 3.57 10*6/MM3 (ref 3.9–5.2)
RBC # BLD AUTO: 4 10*6/MM3 (ref 3.9–5.2)
RBC # UR: ABNORMAL /HPF
REF LAB TEST METHOD: ABNORMAL
RH BLD: POSITIVE
RHINOVIRUS RNA SPEC NAA+PROBE: NOT DETECTED
RSV RNA NPH QL NAA+NON-PROBE: NOT DETECTED
SALICYLATES SERPL-MCNC: 1.7 MG/DL
SAO2 % BLDCOA: 94 % (ref 92–99)
SAO2 % BLDCOA: 97.3 % (ref 92–99)
SAO2 % BLDCOA: 97.8 % (ref 92–99)
SAO2 % BLDCOA: 99.1 % (ref 92–99)
SAO2 % BLDCOA: 99.3 % (ref 92–99)
SCAN SLIDE: NORMAL
SCHISTOCYTES BLD QL SMEAR: ABNORMAL
SET MECH RESP RATE: 18
SET MECH RESP RATE: 24
SODIUM BLD-SCNC: 141 MMOL/L (ref 136–145)
SODIUM BLD-SCNC: 142 MMOL/L (ref 136–145)
SODIUM BLD-SCNC: 142 MMOL/L (ref 136–145)
SODIUM BLD-SCNC: 144 MMOL/L (ref 136–145)
SODIUM BLD-SCNC: 145 MMOL/L (ref 136–145)
SODIUM BLD-SCNC: 146 MMOL/L (ref 136–145)
SODIUM BLD-SCNC: 149 MMOL/L (ref 136–145)
SODIUM UR-SCNC: 42 MMOL/L
SP GR UR STRIP: 1.02 (ref 1–1.03)
SQUAMOUS #/AREA URNS HPF: ABNORMAL /HPF
STRESS TARGET HR: 124 BPM
TOTAL RATE: 28 BREATHS/MINUTE
TOTAL RATE: 34 BREATHS/MINUTE
TOTAL RATE: 39 BREATHS/MINUTE
TOTAL RATE: 42 BREATHS/MINUTE
TOTAL RATE: 44 BREATHS/MINUTE
UNIT  ABO: NORMAL
UNIT  ABO: NORMAL
UNIT  RH: NORMAL
UNIT  RH: NORMAL
UROBILINOGEN UR QL STRIP: ABNORMAL
UUN 24H UR-MCNC: 151 MG/DL
VANCOMYCIN SERPL-MCNC: 16.6 MCG/ML (ref 5–40)
VANCOMYCIN SERPL-MCNC: 22.2 MCG/ML (ref 5–40)
VENTILATOR MODE: ABNORMAL
VT ON VENT VENT: 491 ML
VT ON VENT VENT: 492 ML
VT ON VENT VENT: 499 ML
WBC NRBC COR # BLD: 26.97 10*3/MM3 (ref 4.5–10.7)
WBC NRBC COR # BLD: 27.37 10*3/MM3 (ref 4.5–10.7)
WBC NRBC COR # BLD: 31.87 10*3/MM3 (ref 4.5–10.7)
WBC UR QL AUTO: ABNORMAL /HPF

## 2018-01-01 PROCEDURE — 83605 ASSAY OF LACTIC ACID: CPT | Performed by: EMERGENCY MEDICINE

## 2018-01-01 PROCEDURE — 71045 X-RAY EXAM CHEST 1 VIEW: CPT

## 2018-01-01 PROCEDURE — 99232 SBSQ HOSP IP/OBS MODERATE 35: CPT | Performed by: INTERNAL MEDICINE

## 2018-01-01 PROCEDURE — 93000 ELECTROCARDIOGRAM COMPLETE: CPT | Performed by: INTERNAL MEDICINE

## 2018-01-01 PROCEDURE — 25010000002 PROPOFOL 1000 MG/ML EMULSION: Performed by: INTERNAL MEDICINE

## 2018-01-01 PROCEDURE — 82550 ASSAY OF CK (CPK): CPT | Performed by: INTERNAL MEDICINE

## 2018-01-01 PROCEDURE — 84145 PROCALCITONIN (PCT): CPT | Performed by: INTERNAL MEDICINE

## 2018-01-01 PROCEDURE — 5A1D90Z PERFORMANCE OF URINARY FILTRATION, CONTINUOUS, GREATER THAN 18 HOURS PER DAY: ICD-10-PCS | Performed by: INTERNAL MEDICINE

## 2018-01-01 PROCEDURE — 94799 UNLISTED PULMONARY SVC/PX: CPT

## 2018-01-01 PROCEDURE — 82962 GLUCOSE BLOOD TEST: CPT

## 2018-01-01 PROCEDURE — 81001 URINALYSIS AUTO W/SCOPE: CPT | Performed by: EMERGENCY MEDICINE

## 2018-01-01 PROCEDURE — 83036 HEMOGLOBIN GLYCOSYLATED A1C: CPT | Performed by: INTERNAL MEDICINE

## 2018-01-01 PROCEDURE — 80307 DRUG TEST PRSMV CHEM ANLYZR: CPT | Performed by: INTERNAL MEDICINE

## 2018-01-01 PROCEDURE — 80307 DRUG TEST PRSMV CHEM ANLYZR: CPT | Performed by: EMERGENCY MEDICINE

## 2018-01-01 PROCEDURE — 87581 M.PNEUMON DNA AMP PROBE: CPT | Performed by: INTERNAL MEDICINE

## 2018-01-01 PROCEDURE — 25810000003 DEXTROSE-NACL PER 500 ML: Performed by: EMERGENCY MEDICINE

## 2018-01-01 PROCEDURE — 83735 ASSAY OF MAGNESIUM: CPT | Performed by: INTERNAL MEDICINE

## 2018-01-01 PROCEDURE — 73610 X-RAY EXAM OF ANKLE: CPT

## 2018-01-01 PROCEDURE — 85610 PROTHROMBIN TIME: CPT | Performed by: EMERGENCY MEDICINE

## 2018-01-01 PROCEDURE — 86900 BLOOD TYPING SEROLOGIC ABO: CPT | Performed by: INTERNAL MEDICINE

## 2018-01-01 PROCEDURE — 93010 ELECTROCARDIOGRAM REPORT: CPT | Performed by: INTERNAL MEDICINE

## 2018-01-01 PROCEDURE — 80202 ASSAY OF VANCOMYCIN: CPT | Performed by: INTERNAL MEDICINE

## 2018-01-01 PROCEDURE — 73630 X-RAY EXAM OF FOOT: CPT

## 2018-01-01 PROCEDURE — P9017 PLASMA 1 DONOR FRZ W/IN 8 HR: HCPCS

## 2018-01-01 PROCEDURE — 0BH17EZ INSERTION OF ENDOTRACHEAL AIRWAY INTO TRACHEA, VIA NATURAL OR ARTIFICIAL OPENING: ICD-10-PCS | Performed by: INTERNAL MEDICINE

## 2018-01-01 PROCEDURE — 82330 ASSAY OF CALCIUM: CPT | Performed by: INTERNAL MEDICINE

## 2018-01-01 PROCEDURE — 85610 PROTHROMBIN TIME: CPT | Performed by: INTERNAL MEDICINE

## 2018-01-01 PROCEDURE — 82803 BLOOD GASES ANY COMBINATION: CPT

## 2018-01-01 PROCEDURE — 25010000002 HYDROMORPHONE PER 4 MG: Performed by: INTERNAL MEDICINE

## 2018-01-01 PROCEDURE — 84100 ASSAY OF PHOSPHORUS: CPT | Performed by: INTERNAL MEDICINE

## 2018-01-01 PROCEDURE — 99233 SBSQ HOSP IP/OBS HIGH 50: CPT | Performed by: INTERNAL MEDICINE

## 2018-01-01 PROCEDURE — 99223 1ST HOSP IP/OBS HIGH 75: CPT | Performed by: INTERNAL MEDICINE

## 2018-01-01 PROCEDURE — 85027 COMPLETE CBC AUTOMATED: CPT | Performed by: INTERNAL MEDICINE

## 2018-01-01 PROCEDURE — 25010000002 CEFEPIME PER 500 MG: Performed by: INTERNAL MEDICINE

## 2018-01-01 PROCEDURE — 80053 COMPREHEN METABOLIC PANEL: CPT | Performed by: INTERNAL MEDICINE

## 2018-01-01 PROCEDURE — 25010000002 LORAZEPAM PER 2 MG: Performed by: INTERNAL MEDICINE

## 2018-01-01 PROCEDURE — 84300 ASSAY OF URINE SODIUM: CPT | Performed by: INTERNAL MEDICINE

## 2018-01-01 PROCEDURE — 87486 CHLMYD PNEUM DNA AMP PROBE: CPT | Performed by: INTERNAL MEDICINE

## 2018-01-01 PROCEDURE — 36430 TRANSFUSION BLD/BLD COMPNT: CPT

## 2018-01-01 PROCEDURE — 82140 ASSAY OF AMMONIA: CPT | Performed by: INTERNAL MEDICINE

## 2018-01-01 PROCEDURE — 84460 ALANINE AMINO (ALT) (SGPT): CPT | Performed by: INTERNAL MEDICINE

## 2018-01-01 PROCEDURE — 71250 CT THORAX DX C-: CPT

## 2018-01-01 PROCEDURE — 36600 WITHDRAWAL OF ARTERIAL BLOOD: CPT

## 2018-01-01 PROCEDURE — 25010000002 PROPOFOL 10 MG/ML EMULSION

## 2018-01-01 PROCEDURE — 85007 BL SMEAR W/DIFF WBC COUNT: CPT | Performed by: EMERGENCY MEDICINE

## 2018-01-01 PROCEDURE — 25010000002 VANCOMYCIN 10 G RECONSTITUTED SOLUTION: Performed by: EMERGENCY MEDICINE

## 2018-01-01 PROCEDURE — 86901 BLOOD TYPING SEROLOGIC RH(D): CPT | Performed by: INTERNAL MEDICINE

## 2018-01-01 PROCEDURE — 04HL33Z INSERTION OF INFUSION DEVICE INTO LEFT FEMORAL ARTERY, PERCUTANEOUS APPROACH: ICD-10-PCS | Performed by: INTERNAL MEDICINE

## 2018-01-01 PROCEDURE — 82553 CREATINE MB FRACTION: CPT | Performed by: INTERNAL MEDICINE

## 2018-01-01 PROCEDURE — 94003 VENT MGMT INPAT SUBQ DAY: CPT

## 2018-01-01 PROCEDURE — 02HV33Z INSERTION OF INFUSION DEVICE INTO SUPERIOR VENA CAVA, PERCUTANEOUS APPROACH: ICD-10-PCS | Performed by: INTERNAL MEDICINE

## 2018-01-01 PROCEDURE — 80074 ACUTE HEPATITIS PANEL: CPT | Performed by: INTERNAL MEDICINE

## 2018-01-01 PROCEDURE — 87150 DNA/RNA AMPLIFIED PROBE: CPT | Performed by: EMERGENCY MEDICINE

## 2018-01-01 PROCEDURE — 85025 COMPLETE CBC W/AUTO DIFF WBC: CPT | Performed by: EMERGENCY MEDICINE

## 2018-01-01 PROCEDURE — 84450 TRANSFERASE (AST) (SGOT): CPT | Performed by: INTERNAL MEDICINE

## 2018-01-01 PROCEDURE — 25010000002 HEPARIN (PORCINE) PER 1000 UNITS: Performed by: INTERNAL MEDICINE

## 2018-01-01 PROCEDURE — 86850 RBC ANTIBODY SCREEN: CPT | Performed by: INTERNAL MEDICINE

## 2018-01-01 PROCEDURE — 83605 ASSAY OF LACTIC ACID: CPT | Performed by: INTERNAL MEDICINE

## 2018-01-01 PROCEDURE — 87633 RESP VIRUS 12-25 TARGETS: CPT | Performed by: INTERNAL MEDICINE

## 2018-01-01 PROCEDURE — 25010000002 VANCOMYCIN PER 500 MG: Performed by: INTERNAL MEDICINE

## 2018-01-01 PROCEDURE — 70450 CT HEAD/BRAIN W/O DYE: CPT

## 2018-01-01 PROCEDURE — 87798 DETECT AGENT NOS DNA AMP: CPT | Performed by: INTERNAL MEDICINE

## 2018-01-01 PROCEDURE — 74176 CT ABD & PELVIS W/O CONTRAST: CPT

## 2018-01-01 PROCEDURE — 87040 BLOOD CULTURE FOR BACTERIA: CPT | Performed by: INTERNAL MEDICINE

## 2018-01-01 PROCEDURE — 80069 RENAL FUNCTION PANEL: CPT | Performed by: INTERNAL MEDICINE

## 2018-01-01 PROCEDURE — 99213 OFFICE O/P EST LOW 20 MIN: CPT | Performed by: INTERNAL MEDICINE

## 2018-01-01 PROCEDURE — 99285 EMERGENCY DEPT VISIT HI MDM: CPT

## 2018-01-01 PROCEDURE — 93306 TTE W/DOPPLER COMPLETE: CPT

## 2018-01-01 PROCEDURE — 25010000002 VITAMIN K1 PER 1 MG: Performed by: INTERNAL MEDICINE

## 2018-01-01 PROCEDURE — 82570 ASSAY OF URINE CREATININE: CPT | Performed by: INTERNAL MEDICINE

## 2018-01-01 PROCEDURE — 93005 ELECTROCARDIOGRAM TRACING: CPT | Performed by: EMERGENCY MEDICINE

## 2018-01-01 PROCEDURE — 93306 TTE W/DOPPLER COMPLETE: CPT | Performed by: INTERNAL MEDICINE

## 2018-01-01 PROCEDURE — 25010000002 CALCIUM GLUCONATE PER 10 ML: Performed by: INTERNAL MEDICINE

## 2018-01-01 PROCEDURE — 63710000001 INSULIN ASPART PER 5 UNITS: Performed by: INTERNAL MEDICINE

## 2018-01-01 PROCEDURE — 94002 VENT MGMT INPAT INIT DAY: CPT

## 2018-01-01 PROCEDURE — 5A1945Z RESPIRATORY VENTILATION, 24-96 CONSECUTIVE HOURS: ICD-10-PCS | Performed by: INTERNAL MEDICINE

## 2018-01-01 PROCEDURE — 86927 PLASMA FRESH FROZEN: CPT

## 2018-01-01 PROCEDURE — 25010000002 FENTANYL CITRATE (PF) 100 MCG/2ML SOLUTION

## 2018-01-01 PROCEDURE — 25010000003 POTASSIUM CHLORIDE PER 2 MEQ: Performed by: INTERNAL MEDICINE

## 2018-01-01 PROCEDURE — 87040 BLOOD CULTURE FOR BACTERIA: CPT | Performed by: EMERGENCY MEDICINE

## 2018-01-01 PROCEDURE — 84540 ASSAY OF URINE/UREA-N: CPT | Performed by: INTERNAL MEDICINE

## 2018-01-01 PROCEDURE — 25010000002 LORAZEPAM PER 2 MG

## 2018-01-01 PROCEDURE — 36415 COLL VENOUS BLD VENIPUNCTURE: CPT | Performed by: EMERGENCY MEDICINE

## 2018-01-01 PROCEDURE — 80053 COMPREHEN METABOLIC PANEL: CPT | Performed by: EMERGENCY MEDICINE

## 2018-01-01 PROCEDURE — 87147 CULTURE TYPE IMMUNOLOGIC: CPT | Performed by: EMERGENCY MEDICINE

## 2018-01-01 RX ORDER — PHENYLEPHRINE HCL IN 0.9% NACL 0.5 MG/5ML
.5-3 SYRINGE (ML) INTRAVENOUS
Status: DISCONTINUED | OUTPATIENT
Start: 2018-01-01 | End: 2018-01-01

## 2018-01-01 RX ORDER — ACETAMINOPHEN 325 MG/1
650 TABLET ORAL EVERY 4 HOURS PRN
Status: DISCONTINUED | OUTPATIENT
Start: 2018-01-01 | End: 2018-01-01 | Stop reason: HOSPADM

## 2018-01-01 RX ORDER — DEXTROSE AND SODIUM CHLORIDE 5; .45 G/100ML; G/100ML
75 INJECTION, SOLUTION INTRAVENOUS CONTINUOUS
Status: DISCONTINUED | OUTPATIENT
Start: 2018-01-01 | End: 2018-01-01

## 2018-01-01 RX ORDER — LORAZEPAM 2 MG/ML
2 INJECTION INTRAMUSCULAR
Status: DISCONTINUED | OUTPATIENT
Start: 2018-01-01 | End: 2018-01-01 | Stop reason: HOSPADM

## 2018-01-01 RX ORDER — SODIUM CHLORIDE 450 MG/100ML
75 INJECTION, SOLUTION INTRAVENOUS CONTINUOUS
Status: DISCONTINUED | OUTPATIENT
Start: 2018-01-01 | End: 2018-01-01

## 2018-01-01 RX ORDER — ACETAMINOPHEN 650 MG/1
650 SUPPOSITORY RECTAL EVERY 4 HOURS PRN
Status: DISCONTINUED | OUTPATIENT
Start: 2018-01-01 | End: 2018-01-01 | Stop reason: HOSPADM

## 2018-01-01 RX ORDER — VANCOMYCIN HYDROCHLORIDE 1 G/200ML
1000 INJECTION, SOLUTION INTRAVENOUS ONCE
Status: COMPLETED | OUTPATIENT
Start: 2018-01-01 | End: 2018-01-01

## 2018-01-01 RX ORDER — PROPOFOL 10 MG/ML
VIAL (ML) INTRAVENOUS
Status: COMPLETED
Start: 2018-01-01 | End: 2018-01-01

## 2018-01-01 RX ORDER — FAMOTIDINE 10 MG/ML
20 INJECTION, SOLUTION INTRAVENOUS DAILY
Status: DISCONTINUED | OUTPATIENT
Start: 2018-01-01 | End: 2018-01-01

## 2018-01-01 RX ORDER — SODIUM CHLORIDE 0.9 % (FLUSH) 0.9 %
10 SYRINGE (ML) INJECTION AS NEEDED
Status: DISCONTINUED | OUTPATIENT
Start: 2018-01-01 | End: 2018-01-01

## 2018-01-01 RX ORDER — DEXTROSE MONOHYDRATE 25 G/50ML
INJECTION, SOLUTION INTRAVENOUS
Status: COMPLETED
Start: 2018-01-01 | End: 2018-01-01

## 2018-01-01 RX ORDER — LORAZEPAM 2 MG/ML
2 CONCENTRATE ORAL
Status: DISCONTINUED | OUTPATIENT
Start: 2018-01-01 | End: 2018-01-01 | Stop reason: HOSPADM

## 2018-01-01 RX ORDER — SODIUM CHLORIDE 0.9 % (FLUSH) 0.9 %
10 SYRINGE (ML) INJECTION EVERY 12 HOURS SCHEDULED
Status: DISCONTINUED | OUTPATIENT
Start: 2018-01-01 | End: 2018-01-01

## 2018-01-01 RX ORDER — SODIUM CHLORIDE 9 MG/ML
9 INJECTION, SOLUTION INTRAVENOUS CONTINUOUS
Status: DISCONTINUED | OUTPATIENT
Start: 2018-01-01 | End: 2018-01-01

## 2018-01-01 RX ORDER — POTASSIUM CHLORIDE 29.8 MG/ML
20 INJECTION INTRAVENOUS AS NEEDED
Status: DISCONTINUED | OUTPATIENT
Start: 2018-01-01 | End: 2018-01-01

## 2018-01-01 RX ORDER — DEXTROSE MONOHYDRATE 25 G/50ML
25 INJECTION, SOLUTION INTRAVENOUS
Status: DISCONTINUED | OUTPATIENT
Start: 2018-01-01 | End: 2018-01-01 | Stop reason: HOSPADM

## 2018-01-01 RX ORDER — MAGNESIUM SULFATE 1 G/100ML
2 INJECTION INTRAVENOUS AS NEEDED
Status: DISCONTINUED | OUTPATIENT
Start: 2018-01-01 | End: 2018-01-01

## 2018-01-01 RX ORDER — DEXTROSE MONOHYDRATE 25 G/50ML
25 INJECTION, SOLUTION INTRAVENOUS ONCE
Status: COMPLETED | OUTPATIENT
Start: 2018-01-01 | End: 2018-01-01

## 2018-01-01 RX ORDER — LORAZEPAM 2 MG/ML
2 INJECTION INTRAMUSCULAR EVERY 4 HOURS PRN
Status: DISCONTINUED | OUTPATIENT
Start: 2018-01-01 | End: 2018-01-01

## 2018-01-01 RX ORDER — ACETAMINOPHEN 160 MG/5ML
650 SOLUTION ORAL EVERY 4 HOURS PRN
Status: DISCONTINUED | OUTPATIENT
Start: 2018-01-01 | End: 2018-01-01 | Stop reason: HOSPADM

## 2018-01-01 RX ORDER — NICOTINE POLACRILEX 4 MG
15 LOZENGE BUCCAL
Status: DISCONTINUED | OUTPATIENT
Start: 2018-01-01 | End: 2018-01-01 | Stop reason: HOSPADM

## 2018-01-01 RX ORDER — FENTANYL CITRATE 50 UG/ML
100 INJECTION, SOLUTION INTRAMUSCULAR; INTRAVENOUS ONCE
Status: COMPLETED | OUTPATIENT
Start: 2018-01-01 | End: 2018-01-01

## 2018-01-01 RX ORDER — LORAZEPAM 2 MG/ML
0.5 INJECTION INTRAMUSCULAR
Status: DISCONTINUED | OUTPATIENT
Start: 2018-01-01 | End: 2018-01-01 | Stop reason: HOSPADM

## 2018-01-01 RX ORDER — GABAPENTIN 800 MG/1
800 TABLET ORAL 3 TIMES DAILY
COMMUNITY

## 2018-01-01 RX ORDER — LORAZEPAM 2 MG/ML
1 INJECTION INTRAMUSCULAR
Status: DISCONTINUED | OUTPATIENT
Start: 2018-01-01 | End: 2018-01-01 | Stop reason: HOSPADM

## 2018-01-01 RX ORDER — FENTANYL CITRATE 50 UG/ML
INJECTION, SOLUTION INTRAMUSCULAR; INTRAVENOUS
Status: COMPLETED
Start: 2018-01-01 | End: 2018-01-01

## 2018-01-01 RX ORDER — LORAZEPAM 2 MG/ML
0.5 CONCENTRATE ORAL
Status: DISCONTINUED | OUTPATIENT
Start: 2018-01-01 | End: 2018-01-01 | Stop reason: HOSPADM

## 2018-01-01 RX ORDER — LORAZEPAM 2 MG/ML
2 INJECTION INTRAMUSCULAR EVERY 12 HOURS PRN
Status: DISCONTINUED | OUTPATIENT
Start: 2018-01-01 | End: 2018-01-01

## 2018-01-01 RX ORDER — HYDROMORPHONE HCL 110MG/55ML
2 PATIENT CONTROLLED ANALGESIA SYRINGE INTRAVENOUS
Status: DISCONTINUED | OUTPATIENT
Start: 2018-01-01 | End: 2018-01-01 | Stop reason: HOSPADM

## 2018-01-01 RX ORDER — HEPARIN SODIUM 1000 [USP'U]/ML
INJECTION, SOLUTION INTRAVENOUS; SUBCUTANEOUS AS NEEDED
Status: DISCONTINUED | OUTPATIENT
Start: 2018-01-01 | End: 2018-01-01

## 2018-01-01 RX ORDER — CALCIUM GLUCONATE 94 MG/ML
2 INJECTION, SOLUTION INTRAVENOUS EVERY 4 HOURS
Status: DISCONTINUED | OUTPATIENT
Start: 2018-01-01 | End: 2018-01-01 | Stop reason: SDUPTHER

## 2018-01-01 RX ORDER — DEXTROSE AND SODIUM CHLORIDE 5; .9 G/100ML; G/100ML
100 INJECTION, SOLUTION INTRAVENOUS CONTINUOUS
Status: DISCONTINUED | OUTPATIENT
Start: 2018-01-01 | End: 2018-01-01

## 2018-01-01 RX ORDER — PROPOFOL 10 MG/ML
30 VIAL (ML) INTRAVENOUS ONCE
Status: COMPLETED | OUTPATIENT
Start: 2018-01-01 | End: 2018-01-01

## 2018-01-01 RX ORDER — SODIUM CHLORIDE 9 MG/ML
100 INJECTION, SOLUTION INTRAVENOUS AS NEEDED
Status: DISCONTINUED | OUTPATIENT
Start: 2018-01-01 | End: 2018-01-01

## 2018-01-01 RX ORDER — LORAZEPAM 2 MG/ML
INJECTION INTRAMUSCULAR
Status: COMPLETED
Start: 2018-01-01 | End: 2018-01-01

## 2018-01-01 RX ORDER — DIPHENOXYLATE HYDROCHLORIDE AND ATROPINE SULFATE 2.5; .025 MG/1; MG/1
1 TABLET ORAL
Status: DISCONTINUED | OUTPATIENT
Start: 2018-01-01 | End: 2018-01-01 | Stop reason: HOSPADM

## 2018-01-01 RX ADMIN — FENTANYL CITRATE 100 MCG: 50 INJECTION, SOLUTION INTRAMUSCULAR; INTRAVENOUS at 00:33

## 2018-01-01 RX ADMIN — LORAZEPAM 2 MG: 2 INJECTION INTRAMUSCULAR; INTRAVENOUS at 16:58

## 2018-01-01 RX ADMIN — Medication 20 MG: at 08:28

## 2018-01-01 RX ADMIN — PROPOFOL 20 MCG/KG/MIN: 10 INJECTION, EMULSION INTRAVENOUS at 08:15

## 2018-01-01 RX ADMIN — Medication 10 ML: at 08:26

## 2018-01-01 RX ADMIN — Medication 0.24 MCG/KG/MIN: at 23:24

## 2018-01-01 RX ADMIN — Medication 30 MG: at 16:00

## 2018-01-01 RX ADMIN — INSULIN ASPART 4 UNITS: 100 INJECTION, SOLUTION INTRAVENOUS; SUBCUTANEOUS at 03:30

## 2018-01-01 RX ADMIN — DEXTROSE MONOHYDRATE 25 G: 25 INJECTION, SOLUTION INTRAVENOUS at 04:01

## 2018-01-01 RX ADMIN — REMIFENTANIL HYDROCHLORIDE 14 MCG/KG/HR: 1 INJECTION, POWDER, LYOPHILIZED, FOR SOLUTION INTRAVENOUS at 22:23

## 2018-01-01 RX ADMIN — LORAZEPAM 2 MG: 2 INJECTION INTRAMUSCULAR; INTRAVENOUS at 12:04

## 2018-01-01 RX ADMIN — HYDROMORPHONE HYDROCHLORIDE 2 MG: 2 INJECTION INTRAMUSCULAR; INTRAVENOUS; SUBCUTANEOUS at 12:04

## 2018-01-01 RX ADMIN — Medication 20 MG: at 10:14

## 2018-01-01 RX ADMIN — SODIUM CHLORIDE 9 ML/HR: 9 INJECTION, SOLUTION INTRAVENOUS at 05:21

## 2018-01-01 RX ADMIN — CALCIUM GLUCONATE 2 G: 98 INJECTION, SOLUTION INTRAVENOUS at 21:38

## 2018-01-01 RX ADMIN — Medication 0.02 MCG/KG/MIN: at 15:28

## 2018-01-01 RX ADMIN — Medication 50 MEQ: at 16:07

## 2018-01-01 RX ADMIN — CEFEPIME HYDROCHLORIDE 2 G: 2 INJECTION, POWDER, FOR SOLUTION INTRAVENOUS at 12:06

## 2018-01-01 RX ADMIN — ACETYLCYSTEINE 8160 MG: 200 INJECTION, SOLUTION INTRAVENOUS at 22:47

## 2018-01-01 RX ADMIN — REMIFENTANIL HYDROCHLORIDE 0.5 MCG/KG/HR: 1 INJECTION, POWDER, LYOPHILIZED, FOR SOLUTION INTRAVENOUS at 12:09

## 2018-01-01 RX ADMIN — SODIUM CHLORIDE 1641 ML: 9 INJECTION, SOLUTION INTRAVENOUS at 12:08

## 2018-01-01 RX ADMIN — NOREPINEPHRINE BITARTRATE 0.3 MCG/KG/MIN: 1 INJECTION INTRAVENOUS at 21:14

## 2018-01-01 RX ADMIN — SODIUM CHLORIDE 75 ML/HR: 4.5 INJECTION, SOLUTION INTRAVENOUS at 20:06

## 2018-01-01 RX ADMIN — ACETYLCYSTEINE 12240 MG: 200 INJECTION, SOLUTION INTRAVENOUS at 16:12

## 2018-01-01 RX ADMIN — AZTREONAM 1 G: 1 INJECTION, POWDER, LYOPHILIZED, FOR SOLUTION INTRAMUSCULAR; INTRAVENOUS at 22:42

## 2018-01-01 RX ADMIN — CALCIUM GLUCONATE 2 G: 98 INJECTION, SOLUTION INTRAVENOUS at 10:19

## 2018-01-01 RX ADMIN — Medication 0.24 MCG/KG/MIN: at 06:18

## 2018-01-01 RX ADMIN — DEXTROSE AND SODIUM CHLORIDE 75 ML/HR: 5; 450 INJECTION, SOLUTION INTRAVENOUS at 04:19

## 2018-01-01 RX ADMIN — HYDROMORPHONE HYDROCHLORIDE 2 MG: 2 INJECTION INTRAMUSCULAR; INTRAVENOUS; SUBCUTANEOUS at 12:57

## 2018-01-01 RX ADMIN — LORAZEPAM 2 MG: 2 INJECTION INTRAMUSCULAR; INTRAVENOUS at 12:57

## 2018-01-01 RX ADMIN — AZTREONAM 2 G: 2 INJECTION, POWDER, LYOPHILIZED, FOR SOLUTION INTRAMUSCULAR; INTRAVENOUS at 12:38

## 2018-01-01 RX ADMIN — POTASSIUM CHLORIDE 20 MEQ: 400 INJECTION, SOLUTION INTRAVENOUS at 10:19

## 2018-01-01 RX ADMIN — CALCIUM GLUCONATE 2 G: 98 INJECTION, SOLUTION INTRAVENOUS at 14:30

## 2018-01-01 RX ADMIN — DEXTROSE AND SODIUM CHLORIDE 100 ML/HR: 5; 900 INJECTION, SOLUTION INTRAVENOUS at 11:02

## 2018-01-01 RX ADMIN — PROPOFOL 20 MCG/KG/MIN: 10 INJECTION, EMULSION INTRAVENOUS at 08:58

## 2018-01-01 RX ADMIN — PHYTONADIONE 10 MG: 10 INJECTION, EMULSION INTRAMUSCULAR; INTRAVENOUS; SUBCUTANEOUS at 16:19

## 2018-01-01 RX ADMIN — VANCOMYCIN HYDROCHLORIDE 1000 MG: 1 INJECTION, SOLUTION INTRAVENOUS at 13:41

## 2018-01-01 RX ADMIN — PROPOFOL 50 MCG/KG/MIN: 10 INJECTION, EMULSION INTRAVENOUS at 18:51

## 2018-01-01 RX ADMIN — SODIUM PHOSPHATE, MONOBASIC, MONOHYDRATE 10 MMOL: 276; 142 INJECTION, SOLUTION INTRAVENOUS at 16:10

## 2018-01-01 RX ADMIN — SODIUM CHLORIDE 75 ML/HR: 4.5 INJECTION, SOLUTION INTRAVENOUS at 03:31

## 2018-01-01 RX ADMIN — PROPOFOL 30 MG: 10 INJECTION, EMULSION INTRAVENOUS at 16:00

## 2018-01-01 RX ADMIN — PROPOFOL 50 MCG/KG/MIN: 10 INJECTION, EMULSION INTRAVENOUS at 22:57

## 2018-01-01 RX ADMIN — NOREPINEPHRINE BITARTRATE 0.02 MCG/KG/MIN: 1 INJECTION INTRAVENOUS at 16:00

## 2018-01-01 RX ADMIN — Medication 20 MG: at 18:53

## 2018-01-01 RX ADMIN — POTASSIUM CHLORIDE 20 MEQ: 400 INJECTION, SOLUTION INTRAVENOUS at 14:48

## 2018-01-01 RX ADMIN — ACETYLCYSTEINE 4080 MG: 200 INJECTION, SOLUTION INTRAVENOUS at 18:53

## 2018-01-01 RX ADMIN — SODIUM BICARBONATE 50 MEQ: 84 INJECTION, SOLUTION INTRAVENOUS at 16:07

## 2018-01-01 RX ADMIN — DEXTROSE MONOHYDRATE 25 G: 25 INJECTION, SOLUTION INTRAVENOUS at 10:36

## 2018-01-01 RX ADMIN — Medication 10 ML: at 21:22

## 2018-01-01 RX ADMIN — Medication 10 ML: at 21:33

## 2018-01-01 RX ADMIN — LORAZEPAM 2 MG: 2 INJECTION INTRAMUSCULAR; INTRAVENOUS at 17:35

## 2018-01-01 RX ADMIN — LORAZEPAM 2 MG: 2 INJECTION INTRAMUSCULAR at 16:58

## 2018-01-01 RX ADMIN — SODIUM CHLORIDE 9 ML/HR: 9 INJECTION, SOLUTION INTRAVENOUS at 18:52

## 2018-01-01 RX ADMIN — VANCOMYCIN HYDROCHLORIDE 1750 MG: 10 INJECTION, POWDER, LYOPHILIZED, FOR SOLUTION INTRAVENOUS at 13:27

## 2018-01-01 RX ADMIN — SODIUM CHLORIDE 1000 ML: 9 INJECTION, SOLUTION INTRAVENOUS at 15:35

## 2018-01-01 RX ADMIN — POTASSIUM CHLORIDE 20 MEQ: 400 INJECTION, SOLUTION INTRAVENOUS at 09:16

## 2018-01-01 RX ADMIN — PROPOFOL 25 MCG/KG/MIN: 10 INJECTION, EMULSION INTRAVENOUS at 13:03

## 2018-01-01 RX ADMIN — DEXTROSE MONOHYDRATE 25 G: 25 INJECTION, SOLUTION INTRAVENOUS at 21:22

## 2018-01-01 RX ADMIN — SODIUM BICARBONATE 200 ML/HR: 84 INJECTION, SOLUTION INTRAVENOUS at 22:59

## 2018-01-01 RX ADMIN — CALCIUM GLUCONATE 2 G: 98 INJECTION, SOLUTION INTRAVENOUS at 18:52

## 2018-01-01 RX ADMIN — HEPARIN SODIUM 3000 UNITS: 1000 INJECTION, SOLUTION INTRAVENOUS; SUBCUTANEOUS at 19:36

## 2018-01-01 RX ADMIN — ACETYLCYSTEINE 12240 MG: 6 INJECTION, SOLUTION INTRAVENOUS at 13:30

## 2018-01-01 RX ADMIN — REMIFENTANIL HYDROCHLORIDE 6 MCG/KG/HR: 1 INJECTION, POWDER, LYOPHILIZED, FOR SOLUTION INTRAVENOUS at 10:17

## 2018-01-01 RX ADMIN — PROPOFOL 40 MCG/KG/MIN: 10 INJECTION, EMULSION INTRAVENOUS at 01:50

## 2018-01-01 RX ADMIN — AZTREONAM 1 G: 1 INJECTION, POWDER, LYOPHILIZED, FOR SOLUTION INTRAMUSCULAR; INTRAVENOUS at 03:30

## 2018-01-01 RX ADMIN — SODIUM CHLORIDE 1000 ML: 9 INJECTION, SOLUTION INTRAVENOUS at 17:00

## 2018-01-01 RX ADMIN — SODIUM BICARBONATE 200 ML/HR: 84 INJECTION, SOLUTION INTRAVENOUS at 17:36

## 2018-01-01 RX ADMIN — CALCIUM GLUCONATE 2 G: 98 INJECTION, SOLUTION INTRAVENOUS at 01:56

## 2018-01-01 RX ADMIN — NOREPINEPHRINE BITARTRATE 0.1 MCG/KG/MIN: 1 INJECTION INTRAVENOUS at 05:02

## 2018-01-01 RX ADMIN — Medication 10 ML: at 08:14

## 2018-02-07 NOTE — PROGRESS NOTES
Subjective:     Encounter Date:02/07/2018      Patient ID: Jess Barber is a 72 y.o. female.    Chief Complaint: PFO, stroke    History of Present Illness    Dear Dr. Turcios,     I had the pleasure of seeing your patient in cardiac followup today.  As you well know, she is a miriam 72-year-old woman with history of prior stroke and patent foramen ovale.  She had this closed many years ago.  She is status post lap band surgery and has been losing weight.      She comes in today for her six month followup.  Since I have last seen her, she is doing quite well from the cardiac standpoint.  Her biggest issue is that she has been upset about her 's chronic illness.  He has been sick for years and lately it has been getting worse.  He has chronic pain and reduced mobility.  She states that her  does not have a lot of patience for her even though she is doing all the care.          Review of Systems   All other systems reviewed and are negative.        ECG 12 Lead  Date/Time: 2/7/2018 12:08 PM  Performed by: KENNY DURBIN  Authorized by: KENNY DURBIN   Comparison: compared with previous ECG   Similar to previous ECG  Rhythm: sinus rhythm  BPM: 76  Clinical impression: normal ECG               Objective:     Physical Exam   Constitutional: She is oriented to person, place, and time. She appears well-developed and well-nourished.   HENT:   Head: Normocephalic and atraumatic.   Neck: Normal range of motion. Neck supple.   Cardiovascular: Normal rate, regular rhythm and normal heart sounds.    Pulmonary/Chest: Effort normal and breath sounds normal.   Abdominal: Soft. Bowel sounds are normal.   Musculoskeletal: Normal range of motion.   Neurological: She is alert and oriented to person, place, and time.   Skin: Skin is warm and dry.   Psychiatric: She has a normal mood and affect. Her behavior is normal. Thought content normal.   Vitals reviewed.      Lab Review:       Assessment:          Diagnosis Plan   1.  PFO (patent foramen ovale)     2. History of stroke            Plan:        It was a pleasure to see your patient in cardiac followup today.  She is doing well from the standpoint of her PFO and stroke.  Her blood pressure is up today simply because of stress.      I have given her reassurance.  I think she will improve once her 's status gets better.  She will see me again in six months or sooner if symptoms warrant.

## 2018-03-12 PROBLEM — E16.2 HYPOGLYCEMIA: Status: ACTIVE | Noted: 2018-01-01

## 2018-03-12 NOTE — ED NOTES
EMS states initail BSFS was low, gave amp of D50, rechecked FSBS was at 20, gave 5% Dextrose and BS came up to 63. Pt is unresponsive to voice, withdraws to pain.  Respirations at 40. EMS reports pt had flu last week.       Quita Marino RN  03/12/18 [id number]

## 2018-03-12 NOTE — OP NOTE
Central Venous Catheter Insertion Procedure Note      Indications:  vascular access    Procedure Details   Informed consent was obtained for the procedure, including sedation.  Risks of lung perforation, hemorrhage, arrhythmia, and adverse drug reaction were discussed.     Maximum sterile technique was used including usual patient drapes, antiseptics and physician sterile garments.    Under sterile conditions the skin above the on the right femoral vein was prepped with Chlorhexidine and covered with a sterile drape. Local anesthesia was applied to the skin and subcutaneous tissues with lidocaine 1%. An 18-gauge needle was then inserted into the vein. A guide wire was then passed easily through the catheter. A quad lumen was then inserted into the vessel over the guide wire. The catheter was sutured into place and dressed following sterile protocol with Biopatch placed.    Findings:  There were no changes to vital signs. All catheter ports were flushed with saline. Patient did tolerate procedure well.    Recommendations:  CXR ordered to verify placement. No chest x-ray ordered if this was a femoral vein.    Ismael Ibarra MD  3/12/2018

## 2018-03-12 NOTE — PROCEDURES
Endotracheal Intubation Procedure Note    Indication for endotracheal intubation: airway compromise, impending respiratory failure and respiratory failure.  Sedation: Propofol 3 cc IV bolus.  Equipment: A video Glidoscope was used and Nuzhat 3 laryngoscope blade.  Number of attempts: 1.  ETT location confirmed by by auscultation and ETCO2 monitor's.  ET tube secured at 24 cm at the lip  Postprocedure sats greater than 98%  Bilateral breath sounds confirmed  No acute complications      Ismael Ibarra MD  3/12/2018

## 2018-03-12 NOTE — H&P
Group: [facility]         H/p  NOTE    Patient Identification:  Jess Barber  72 y.o.  female  1945  [id number]                CC: Shock    History of Present Illness:  72-year-old female history of CVA and chronic pain with recent dental cleaning about 2 weeks ago apparently has been sick for the last 3-4 days.  Dr. Aura Rm who is her primary care physician actually related some of the history.  Apparently the family felt she was having flulike symptoms with myalgias etc. and patient was seen by Dr. Rm on Friday as a home visit.  At that time she had a flu test done at home which was negative.  According to Dr. Rm the  related that last night she was not feeling well and missed the cats game.  The spouse found the patient unresponsive this morning and paramedics were called.  On arrival she was noted to have low blood glucose and D50 was administered.  Patient was noted to be minimally responsive and multiorgan system failure in the emergency room.  Initially I was told that she was a DNR but then when she arrived in the ICU Dr. Rm had further discussion with the family and she was made a full code.  Patient obtunded unable to give me any meaningful history.  There is also some pain medications with Tylenol that has been missing.      Review of Systems  As above rest of the 12 point review of system negative  Past Medical History:  Past Medical History:   Diagnosis Date   • Abnormal finding in urine    • Acute bronchitis    • Asthma    • Atypical chest pain    • Breast cancer screening    • Bunion    • Cough    • Diabetes mellitus    • Diverticulosis    • Dyslipidemia 03/04/2015   • Eczema 05/14/2014    xerotic   • Edema 08/09/2015   • External hemorrhoids    • Fatigue    • Foramen ovale    • Headache     cerebellar   • High risk medication use 03/04/2015    hydrocodone   • Hyperglycemia    • Hyperlipidemia    • Influenza    • Internal hemorrhoids    • Knee  osteoarthritis    • Left knee pain    • Nausea    • Obesity    • Osteoporosis    • PAF (paroxysmal atrial fibrillation)    • PFO (patent foramen ovale) 11/17/2014   • Post-menopausal    • Post-operative complication 07/29/2015   • Pre-op testing    • Preoperative examination    • Psoriasis    • Rhinosinusitis    • Sacroiliitis    • Sebaceous cyst    • Sleep apnea    • Stroke    • UTI (urinary tract infection) 07/29/2015   • Vertigo    • Vitamin B12 deficiency    • Vitamin D deficiency    • Wheezing        Past Surgical History:  Past Surgical History:   Procedure Laterality Date   • BREAST SURGERY      biopsy   • CARDIAC CATHETERIZATION      1995 Dr. Alcazar- repeat was in 2010   • CHOLECYSTECTOMY     • COLON SURGERY      resection x2   • HYSTERECTOMY     • INCISIONAL BREAST BIOPSY     • LAPAROSCOPIC GASTRIC BANDING     • PATENT FORAMEN OVALE CLOSURE     • TOTAL KNEE ARTHROPLASTY      Right knee   • WRIST SURGERY      carpal tunnel        Home Meds:  Prescriptions Prior to Admission   Medication Sig Dispense Refill Last Dose   • diltiazem (TIAZAC) 120 MG 24 hr capsule Take 1 capsule by mouth daily.   Taking   • fluocinonide (LIDEX) 0.05 % cream Apply topically 3 (three) times a day. Apply sparingly to affected areas   Taking   • furosemide (LASIX) 40 MG tablet Take 1 tablet by mouth Daily.   Taking   • gabapentin (NEURONTIN) 300 MG capsule Take 600 mg by mouth.   Taking   • gabapentin (NEURONTIN) 800 MG tablet Take 800 mg by mouth 3 (Three) Times a Day.   Taking   • metoprolol succinate XL (TOPROL-XL) 25 MG 24 hr tablet Take 25 mg by mouth Daily.   Taking   • oxyCODONE-acetaminophen (PERCOCET) 7.5-325 MG per tablet Take 1-2 tablets by mouth As Needed.   Taking   • promethazine (PHENERGAN) 25 MG tablet Take 25 mg by mouth As Needed.   Taking       Allergies:  Allergies   Allergen Reactions   • Erythromycin Hives   • Ketorolac Itching   • Morphine And Related Itching and Swelling   • Sulfa Antibiotics    • Penicillins  "Rash       Social History:   Social History     Social History   • Marital status:      Spouse name: N/A   • Number of children: N/A   • Years of education: N/A     Occupational History   • Not on file.     Social History Main Topics   • Smoking status: Former Smoker   • Smokeless tobacco: Not on file      Comment: socially in college   • Alcohol use No      Comment: social   • Drug use: Unknown   • Sexual activity: Defer     Other Topics Concern   • Not on file     Social History Narrative   • No narrative on file       Family History:  Family History   Problem Relation Age of Onset   • Family history unknown: Yes       Physical Exam:  BP (!) 93/29   Pulse 89   Temp 96 °F (35.6 °C) (Tympanic)   Resp 26   Ht 162.6 cm (64\")   Wt 81.6 kg (180 lb)   SpO2 100%   BMI 30.90 kg/m²  Body mass index is 30.9 kg/m². 100% 81.6 kg (180 lb)  Physical Exam  Elderly female obtunded with irregular respirations unable to protect her airway  Oral cavity dry mucous membrane  Neck is supple no bruit no adenopathy  Chest diminished breath sounds rhonchi on the bases  CVS regular rate and rhythm no murmurs or gallops  Abdomen is soft no masses felt nontender  Extremities no edema no cyanosis no clubbing  Skin she's got extensive bruising on the back including her to be local area as bilaterally  CNS moving all extremities but no purposeful movement and not following commands for me  No joint deformities  LABS:  Lab Results   Component Value Date    CALCIUM 7.3 (L) 03/12/2018     Results from last 7 days  Lab Units 03/12/18  1117   SODIUM mmol/L 145   POTASSIUM mmol/L 3.2*   CHLORIDE mmol/L 97*   CO2 mmol/L 9.5*   BUN mg/dL 23   CREATININE mg/dL 4.26*   GLUCOSE mg/dL 266*   CALCIUM mg/dL 7.3*   WBC 10*3/mm3 26.97*   HEMOGLOBIN g/dL 12.3   PLATELETS 10*3/mm3 451   ALT (SGPT) U/L 5,214*   AST (SGOT) U/L >7,000*     Lab Results   Component Value Date    CKTOTAL 1,807 (H) 03/12/2018    CKMB 96.83 (H) 03/12/2018       Results from " last 7 days  Lab Units 03/12/18  1117   CK TOTAL U/L 1,807*           Results from last 7 days  Lab Units 03/12/18  1117   LACTATE mmol/L 14.7*       Results from last 7 days  Lab Units 03/12/18  1227   PH, ARTERIAL pH units 7.265*   PCO2, ARTERIAL mm Hg <14.4*   PO2 ART mm Hg 77.8*   MODALITY  Room Air   O2 SATURATION CALC % 94.0           Results from last 7 days  Lab Units 03/12/18  1200   INR  6.76*         Lab Results   Component Value Date    TSH 3.24 04/14/2015     Estimated Creatinine Clearance: 12 mL/min (by C-G formula based on SCr of 4.26 mg/dL (H)).    Results from last 7 days  Lab Units 03/12/18  1126   NITRITE UA  Negative   WBC UA /HPF 0-2   BACTERIA UA /HPF None Seen   SQUAM EPITHEL UA /HPF 7-12*        Imaging: I personally visualized the images of scans/x-rays performed within last 3 days.      Assessment:  Severe metabolic acidosis  Septic shock  Acute respiratory failure  Acute hepatitis/shock liver  Acute renal failure  Multiorgan system failure  Coagulopathy due to shocked liver  History of CVA      Recommendations:  At this point we have a female with multiorgan system failure likely from septic shock source unclear.  We will continue empiric antibiotics with aspirin aztreonam and vancomycin for now.  De-escalate antibiotics based on cultures.  Check CT chest abdomen pelvis to look for a source of sepsis.  Resuscitated with IV fluids and pressors and start bicarbonate drip for systemic acidosis  Levophed will be the pressor of choice initially  Patient will be intubated and placed on vent support for airway protection and respiratory failure  GI will be consulted for shocked liver.  There is some history of missing oxycodone and Neurontin.  She has been started on Tylenol toxicity protocol with Mucomyst.  We will trend LFTs  Renal will be consulted for acute renal failure.  She may need dialysis later tonight if she does not make much urine  Use Ativan for sedation due to liver failure  ICU  core measures  Discussed with Dr. orona in detail  We'll discuss further with family regarding goals of care  Her prognosis is extremely poor    Critical care time 95 minutes              Ismael Ibarra MD  3/12/2018  4:34 PM      Much of this encounter note is an electronic transcription/translation of spoken language to printed text using Dragon Software.

## 2018-03-12 NOTE — ED PROVIDER NOTES
"EMERGENCY DEPARTMENT ENCOUNTER       CHIEF COMPLAINT  Chief Complaint: Hypoglycemia  History given by: Paramedics  History limited by: Acuity of patient's condition  Room Number: 15/15  PMD: Aura Turcios MD      HPI:  All history was obtained from the paramedics. Per paramedics, pt's spouse reports that pt has no prior h/o diabetes and is not currently on any diabetic medications. Pt's spouse found pt unresponsive this AM due to which the paramedics were called. Upon paramedics' arrival on scene, pt's blood glucose was checked; the glucometer read as \"low\". Consequently, pt was administered D5% and D-50. After administration of the D-50 and D5%, pt's blood glucose gradually increased to 40. Upon pt's arrival to the ER, pt's blood glucose is 37 and pt is minimally responsive.         Hypoglycemia   Initial blood sugar:  \"low\"  Blood sugar after intervention:  40 en route to the ED; in the ED, 37  Severity:  Severe  Onset quality:  Unable to specify  Duration: family found pt unresponsive this AM.  Timing:  Constant  Progression:  Improving  Diabetic status:  Non-diabetic (per family)  Relieved by:  IV glucose  Associated symptoms: altered mental status (decreased responsiveness) and decreased responsiveness          PAST MEDICAL HISTORY  Active Ambulatory Problems     Diagnosis Date Noted   • Abnormal finding in urine 02/08/2016   • Bunion 02/08/2016   • Dyslipidemia 02/08/2016   • Edema 02/08/2016   • Fatigue 02/08/2016   • Headache 02/08/2016   • Hyperglycemia 02/08/2016   • Osteoarthritis of knee 02/08/2016   • Knee pain 02/08/2016   • Nausea 02/08/2016   • Patent foramen ovale 02/08/2016   • Postoperative complication 02/08/2016   • Psoriasis 02/08/2016   • Cerebrovascular accident 02/08/2016   • Lower urinary tract infectious disease 02/08/2016   • Cobalamin deficiency 02/08/2016   • Vitamin D deficiency 02/08/2016   • Winter itch 02/08/2016     Resolved Ambulatory Problems     Diagnosis Date Noted   • No " Resolved Ambulatory Problems     Past Medical History:   Diagnosis Date   • Abnormal finding in urine    • Acute bronchitis    • Asthma    • Atypical chest pain    • Breast cancer screening    • Bunion    • Cough    • Diabetes mellitus    • Diverticulosis    • Dyslipidemia 03/04/2015   • Eczema 05/14/2014   • Edema 08/09/2015   • External hemorrhoids    • Fatigue    • Foramen ovale    • Headache    • High risk medication use 03/04/2015   • Hyperglycemia    • Hyperlipidemia    • Influenza    • Internal hemorrhoids    • Knee osteoarthritis    • Left knee pain    • Nausea    • Obesity    • Osteoporosis    • PAF (paroxysmal atrial fibrillation)    • PFO (patent foramen ovale) 11/17/2014   • Post-menopausal    • Post-operative complication 07/29/2015   • Pre-op testing    • Preoperative examination    • Psoriasis    • Rhinosinusitis    • Sacroiliitis    • Sebaceous cyst    • Sleep apnea    • Stroke    • UTI (urinary tract infection) 07/29/2015   • Vertigo    • Vitamin B12 deficiency    • Vitamin D deficiency    • Wheezing          PAST SURGICAL HISTORY  Past Surgical History:   Procedure Laterality Date   • BREAST SURGERY      biopsy   • CARDIAC CATHETERIZATION      1995 Dr. Alcazar- repeat was in 2010   • CHOLECYSTECTOMY     • COLON SURGERY      resection x2   • HYSTERECTOMY     • INCISIONAL BREAST BIOPSY     • LAPAROSCOPIC GASTRIC BANDING     • PATENT FORAMEN OVALE CLOSURE     • TOTAL KNEE ARTHROPLASTY      Right knee   • WRIST SURGERY      carpal tunnel         FAMILY HISTORY  Family History   Problem Relation Age of Onset   • Family history unknown: Yes         SOCIAL HISTORY  Social History     Social History   • Marital status:      Spouse name: N/A   • Number of children: N/A   • Years of education: N/A     Occupational History   • Not on file.     Social History Main Topics   • Smoking status: Former Smoker   • Smokeless tobacco: Not on file      Comment: socially in college   • Alcohol use No       Comment: social   • Drug use: Unknown   • Sexual activity: Defer     Other Topics Concern   • Not on file     Social History Narrative   • No narrative on file         ALLERGIES  Erythromycin; Ketorolac; Morphine and related; Sulfa antibiotics; and Penicillins        REVIEW OF SYSTEMS  Review of Systems   Unable to perform ROS: Acuity of condition   Constitutional: Positive for decreased responsiveness.   Neurological:        Unresponsiveness            PHYSICAL EXAM  ED Triage Vitals [03/12/18 1024]   Temp Heart Rate Resp BP SpO2   -- 97 -- 97/81 99 %      Temp src Heart Rate Source Patient Position BP Location FiO2 (%)   -- -- -- -- --       Physical Exam   Constitutional: She appears distressed.   HENT:   Head: Normocephalic.   Mouth/Throat: Mucous membranes are dry.   Eyes: Pupils are equal, round, and reactive to light.   Neck: Neck supple.   Cardiovascular: Normal rate, regular rhythm and normal heart sounds.    Pulmonary/Chest: Effort normal and breath sounds normal. No respiratory distress. She has no decreased breath sounds. She has no wheezes. She has no rhonchi. She has no rales.   Abdominal: Soft. There is no tenderness. There is no rebound and no guarding.   Musculoskeletal:   Pt moves all extremities.    Neurological: She displays weakness (pt exhibits generalized weakness, but moves all extremities).   Pt appears lethargic. Pt does not follow commands.   Skin: Skin is dry.   Skin feels cool.   Psychiatric:   Unable to assess due to the acuity of pt's condition.    Nursing note and vitals reviewed.          LAB RESULTS  Recent Results (from the past 24 hour(s))   POC Glucose Once    Collection Time: 03/12/18 10:41 AM   Result Value Ref Range    Glucose 102 70 - 130 mg/dL   POC Glucose Once    Collection Time: 03/12/18 11:00 AM   Result Value Ref Range    Glucose 103 70 - 130 mg/dL   Comprehensive Metabolic Panel    Collection Time: 03/12/18 11:17 AM   Result Value Ref Range    Glucose 266 (H) 65 - 99  mg/dL    BUN 23 8 - 23 mg/dL    Creatinine 4.26 (H) 0.57 - 1.00 mg/dL    Sodium 145 136 - 145 mmol/L    Potassium 3.2 (L) 3.5 - 5.2 mmol/L    Chloride 97 (L) 98 - 107 mmol/L    CO2 9.5 (L) 22.0 - 29.0 mmol/L    Calcium 7.3 (L) 8.6 - 10.5 mg/dL    Total Protein 5.8 (L) 6.0 - 8.5 g/dL    Albumin 3.00 (L) 3.50 - 5.20 g/dL    ALT (SGPT) 5,214 (H) 1 - 33 U/L    AST (SGOT) >7,000 (H) 1 - 32 U/L    Alkaline Phosphatase 278 (H) 39 - 117 U/L    Total Bilirubin 4.5 (H) 0.1 - 1.2 mg/dL    eGFR Non African Amer 10 (L) >60 mL/min/1.73    Globulin 2.8 gm/dL    A/G Ratio 1.1 g/dL    BUN/Creatinine Ratio 5.4 (L) 7.0 - 25.0    Anion Gap 38.5 mmol/L   Lactic Acid, Plasma    Collection Time: 03/12/18 11:17 AM   Result Value Ref Range    Lactate 14.7 (C) 0.5 - 2.0 mmol/L   CBC Auto Differential    Collection Time: 03/12/18 11:17 AM   Result Value Ref Range    WBC 26.97 (H) 4.50 - 10.70 10*3/mm3    RBC 4.00 3.90 - 5.20 10*6/mm3    Hemoglobin 12.3 11.9 - 15.5 g/dL    Hematocrit 37.6 35.6 - 45.5 %    MCV 94.0 80.5 - 98.2 fL    MCH 30.8 26.9 - 32.0 pg    MCHC 32.7 32.4 - 36.3 g/dL    RDW 16.7 (H) 11.7 - 13.0 %    RDW-SD 55.4 (H) 37.0 - 54.0 fl    MPV 9.2 6.0 - 12.0 fL    Platelets 451 140 - 500 10*3/mm3   Scan Slide    Collection Time: 03/12/18 11:17 AM   Result Value Ref Range    Scan Slide     Lactic Acid, Reflex Timer (This will reflex a repeat order 3-3:15 hours after ordered.)    Collection Time: 03/12/18 11:17 AM   Result Value Ref Range    Extra Tube Hold for add-ons.    Manual Differential    Collection Time: 03/12/18 11:17 AM   Result Value Ref Range    Neutrophil % 87.0 (H) 42.7 - 76.0 %    Lymphocyte % 8.0 (L) 19.6 - 45.3 %    Monocyte % 5.0 5.0 - 12.0 %    Neutrophils Absolute 23.46 (H) 1.90 - 8.10 10*3/mm3    Lymphocytes Absolute 2.16 0.90 - 4.80 10*3/mm3    Monocytes Absolute 1.35 (H) 0.20 - 1.20 10*3/mm3    Anisocytosis Mod/2+ None Seen    Margie Cells Mod/2+ None Seen    Schistocytes Slight/1+ None Seen    Vacuolated  Neutrophils Slight/1+ None Seen    Platelet Morphology Normal Normal   Acetaminophen Level    Collection Time: 03/12/18 11:17 AM   Result Value Ref Range    Acetaminophen <5.0 (L) 10.0 - 30.0 mcg/mL   Urinalysis With / Culture If Indicated - Urine, Catheter    Collection Time: 03/12/18 11:26 AM   Result Value Ref Range    Color, UA Dark Yellow (A) Yellow, Straw    Appearance, UA Cloudy (A) Clear    pH, UA 5.5 5.0 - 8.0    Specific Gravity, UA 1.020 1.005 - 1.030    Glucose, UA Negative Negative    Ketones, UA Negative Negative    Bilirubin, UA Negative Negative    Blood, UA Negative Negative    Protein, UA 30 mg/dL (1+) (A) Negative    Leuk Esterase, UA Small (1+) (A) Negative    Nitrite, UA Negative Negative    Urobilinogen, UA 1.0 E.U./dL 0.2 - 1.0 E.U./dL   Urinalysis, Microscopic Only - Urine, Clean Catch    Collection Time: 03/12/18 11:26 AM   Result Value Ref Range    RBC, UA 0-2 None Seen, 0-2 /HPF    WBC, UA 0-2 None Seen, 0-2 /HPF    Bacteria, UA None Seen None Seen /HPF    Squamous Epithelial Cells, UA 7-12 (A) None Seen, 0-2 /HPF    Hyaline Casts, UA 0-2 None Seen /LPF    Methodology Manual Light Microscopy    Protime-INR    Collection Time: 03/12/18 12:00 PM   Result Value Ref Range    Protime 57.8 (C) 11.7 - 14.2 Seconds    INR 6.76 (C) 0.90 - 1.10   Blood Gas, Arterial    Collection Time: 03/12/18 12:27 PM   Result Value Ref Range    Site Arterial: left radial     Carlos's Test Positive     pH, Arterial 7.265 (C) 7.350 - 7.450 pH units    pCO2, Arterial <14.4 (C) 35.0 - 45.0 mm Hg    pO2, Arterial 77.8 (L) 80.0 - 100.0 mm Hg    HCO3, Arterial 6.1 (L) 22.0 - 28.0 mmol/L    Base Excess, Arterial -18.5 (L) 0.0 - 2.0 mmol/L    O2 Saturation Calculated 94.0 92.0 - 99.0 %    Barometric Pressure for Blood Gas 755.6 mmHg    Modality Room Air     Rate 28 Breaths/minute       Ordered the above labs and reviewed the results.        RADIOLOGY  XR Chest 1 View   Final Result   No focal pulmonary consolidation.  Borderline heart size,   with mild prominence of interstitial markings. Follow-up as indications   persist.       This report was finalized on 3/12/2018 12:05 PM by Dr. Clayton Chavarrai MD.    Interpreted by radiologist. Independently viewed by me.             CT Head Without Contrast    (Results Pending)          Ordered the above noted radiological studies. Reviewed by me in PACS.       PROCEDURES  Critical Care  Performed by: PALAK MAXWELL  Authorized by: PALAK MAXWELL     Critical care provider statement:     Critical care time (minutes):  55    Critical care time was exclusive of:  Separately billable procedures and treating other patients    Critical care was necessary to treat or prevent imminent or life-threatening deterioration of the following conditions:  Endocrine crisis, hepatic failure, sepsis, renal failure, shock and CNS failure or compromise    Critical care was time spent personally by me on the following activities:  Development of treatment plan with patient or surrogate, discussions with consultants, discussions with primary provider, evaluation of patient's response to treatment, examination of patient, obtaining history from patient or surrogate, ordering and performing treatments and interventions, ordering and review of laboratory studies, ordering and review of radiographic studies, pulse oximetry and re-evaluation of patient's condition          EKG:           EKG time: 10:57 AM  Rhythm/Rate: NSR rate 94  P waves and MO: Normal P waves  QRS, axis: Normal QRS   ST and T waves: Normal ST  Significant artifact present     Interpreted Contemporaneously by me, independently viewed  Similar compared to prior 02/07/18          PROGRESS AND CONSULTS  ED Course   Comment By Time   1:09 PM  Patient presents with hypoglycemic from unclear etiology.  Given D50 twice and now on D5.  Patient hypothermic and has markedly elevated lactic acidosis.  Elevated WBC and has liver and kidney  failure.  Felt to be septic and given antibiotics and fluids.  PMD called and states patient is in pain management and may have taken too many of her percocet over the last week.  Discussed with Dr. DE LOS SANTOS.  Will give empiric dose of acetadote but if this is the cause, we may be too late.  Discussed multiple times with her  who states she does not want CPR and absolutely does not want to be on a ventilator.  No reason to think she overdosed as she has not been depressed recently. Gentry Johnson MD 03/12 1309     10:46 AM:  Upon pt's arrival to the ER, pt's blood glucose was 37 - pt was administered D-50 in the ER. Blood work, UA, lactic acid, blood cultures, EKG, CXR, and CT Head ordered for further evaluation. D5% ordered for pt also.     12:02 PM:  Pt's lactic acid is 14.7. 30 cc/kg bolus ordered. ABG ordered for further evaluation.      12:07 PM:  Rechecked pt. On re-evaluation, pt is moving all extremities. There is a bruise present to the right upper leg. Pt does not respond to verbal stimuli. Pt's family is at pt's bedside. Per family, for the past week, pt has had lethargy and generalized weakness. Pt was evaluated by her PMD for this and was prescribed with rx for Tamiflu to cover for the flu. Family reports that pt's level of consciousness has been gradually worsening over the past 2-3 days and today, pt's spouse found pt unresponsive in her bed. Family reports that pt has not sustained any known falls recently.     Informed pt's family that upon pt's arrival to the ER, pt was significantly hypoglycemic due to which pt has been administered IV glucose. Pt's lactic acid is 14.7. Pt's WBC is 26.97. Pt is in critical condition. Need for pt's admission to the ICU for further evaluation and management. Pt's family agrees with plan. Decision time to admit: Now.     Family reports that pt does not want to be placed on a ventilator should pt require one.    12:15 PM:  Aztreonam and Vancomycin ordered to treat  for infection. Placed call to the intensivist for admission.     12:20 PM:  Rechecked pt. Informed pt's family that pt's CMP is significantly abnormal. Pt's LFTs are markedly elevated. I have ordered antibiotics and IV fluids to treat for pt's elevated lactic acid and WBC. Family agrees with plan.     Family again reports that pt does not want to be placed on a ventilator should pt require one.     12:32 PM:  Discussed case with Dr. Castro, intensivist. He will admit pt to an ICU bed.     12:44 PM:  Discussed the case with Dr. Turcios, PMSVEN. She states that about a week ago, pt developed generalized myalgias and general malaise. Family attempted to send pt to Dr. Turcios's office for this; however, pt refused to go. Consequently, Dr. Turcios prescribed pt with rx for Tamiflu. About 5-6 days ago, pt was prescribed with rx for Percocet 7.5 mg 90 pills by her pain management physician's office. Family states that this bottle of Percocet went missing about 3 days ago.     1:02 PM:  I have informed Dr. Castro of my d/w Dr. Turcios PMSVEN. He would like pt administered Acetadote -> pharmacist has ordered this. Acetaminophen level and INR ordered for further evaluation.     Pt's CT Head will be held at the present time.     1:23 PM:  Rechecked pt. Pt's family was informed that pt has been admitted to the ICU. Family agrees with plan.    Family also reports that pt has Tylenol at her bedside that pt has been taking recently to treat for her myalgias.           MEDICAL DECISION MAKING        MDM  Number of Diagnoses or Management Options  Acute liver failure without hepatic coma:   Altered mental status, unspecified altered mental status type:   Hypoglycemia:   Renal insufficiency:   Septic shock:   Supratherapeutic international normalized ratio (INR):      Amount and/or Complexity of Data Reviewed  Clinical lab tests: ordered and reviewed (Lactic acid is 14.7. )  Tests in the radiology section of CPT®: ordered and reviewed  (CXR:  No focal pulmonary consolidation. Borderline heart size,  with mild prominence of interstitial markings. Follow-up as indications  persist.)  Tests in the medicine section of CPT®: reviewed and ordered (EKG interpreted.   )  Obtain history from someone other than the patient: yes (Paramedics provide significant history. Family and Dr. Turcios PMSVEN, provide additional history. )  Discuss the patient with other providers: yes (Case d/w Dr. Castro, intensivist, who will admit pt to an ICU bed. Case d/w Dr. Turcios, PMD.   )    Patient Progress  Patient progress: other (comment) (Pt is in critical condition. )             DIAGNOSIS  Final diagnoses:   Hypoglycemia   Septic shock   Renal insufficiency   Acute liver failure without hepatic coma   Altered mental status, unspecified altered mental status type   Supratherapeutic international normalized ratio (INR)         DISPOSITION  Pt admitted to the ICU.      ADMISSION    Discussed treatment plan and reason for admission with pt/family and admitting physician.  Pt/family voiced understanding of the plan for admission for further testing/treatment as needed.                 Latest Documented Vital Signs:  As of 2:59 PM  BP- 106/82 HR- 92 Temp- 96 °F (35.6 °C) (Tympanic) O2 sat- 97%      --  Documentation assistance provided by carla Dickson for Dr. Elizabeth MD.  Information recorded by the carla was done at my direction and has been verified and validated by me.             Paul Dickson  03/12/18 5342       Paul Dickson  03/12/18 1503       Gentry Johnson MD  03/12/18 3033

## 2018-03-12 NOTE — ED NOTES
Removed ring from left hand finger. Gave ring to son.     Loco MCKNIGHT [place]  03/12/18 4320

## 2018-03-12 NOTE — NURSING NOTE
Pt restless and shaking head not following commands, fighting vent.  Sayied notified orders received

## 2018-03-12 NOTE — PROGRESS NOTES
"Pharmacy to dose Vancomycin    Day 1  Consult for Dr Ibarra  Treating: sepsis  Duration: 7 days    Anti-Infectives     Ordered     Dose/Rate Route Frequency Start Stop    03/12/18 1637  aztreonam (AZACTAM) 1 g/100 mL 0.9% NS IVPB (mbp)     Ordering Provider:  Ismael Ibarra MD    1 g  over 30 Minutes Intravenous Every 8 Hours 03/12/18 2000 03/19/18 1959 03/12/18 1637  Pharmacy to dose vancomycin     Ordering Provider:  Ismael Ibarra MD     Does not apply Continuous PRN 03/12/18 1637 03/19/18 1636    03/12/18 1213  aztreonam (AZACTAM) 2 g/100 mL 0.9% NS (mbp)     Ordering Provider:  Gentry Johnson MD    2 g  over 30 Minutes Intravenous Once 03/12/18 1215 03/12/18 1341    03/12/18 1213  vancomycin 1750 mg/500 mL 0.9% NS IVPB ([facility])     Ordering Provider:  Gentry Johnson MD    20 mg/kg × 81.6 kg Intravenous Once 03/12/18 1215 03/12/18 1327           Relevant clinical data and objective history reviewed:  72 y.o. female 162.6 cm (64\") 81.6 kg (180 lb)    Lab Results   Component Value Date    CREATININE 4.26 (H) 03/12/2018    CREATININE 0.75 07/23/2015    CREATININE 0.81 04/14/2015     Estimated Creatinine Clearance: 12 mL/min (by C-G formula based on SCr of 4.26 mg/dL (H)).    Lab Results   Component Value Date    WBC 26.97 (H) 03/12/2018    WBC 7.30 07/23/2015    WBC 10.20 06/27/2014     Temp Readings from Last 3 Encounters:   03/12/18 96 °F (35.6 °C) (Tympanic)   07/23/15 98.3 °F (36.8 °C) (Oral)   06/01/15 98.4 °F (36.9 °C) (Oral)       Baseline cultures:  3/12 blood cx pending    Vancomycin Dosing History  3/12 @ 1327 1750mg X one    PLAN: Patient found down and hypoglycemic at home, admitted with acute liver failure, possible sepsis.  Patient received a loading dose of vancomycin in ER this afternoon.  Will pulse dose based on elevated Cr and renal function.  Will check a random level in the am and re-dose as appropriate.     Ita ThomasD, BCPS  03/12/18 4:42 PM            "

## 2018-03-12 NOTE — NURSING NOTE
Pt arrived from ER on nonrebreather, pale, b/p 70 systolic.  Dr Turcios at bedside talked with Dr Ibarra to update on situation.  Dr Ibarra notified of b/p and breathing pattern. Dr Turcios talked with family and they wish for her to be a full code at this time.  Pt intubated and femoral line placed per Dr Ibarra.  Family updated per Dr Turcios.

## 2018-03-12 NOTE — CONSULTS
Patient Care Team:  Aura Turcios MD as PCP - General  Aura Turcios MD as PCP - Family Medicine  Pratik Hung MD as PCP - Claims Attributed    Chief complaint: PARESH, metabolic acidosis      History of Present Illness  Patient is a 73 yo WF with no known renal disease who was brought to the ED when she was found down. History obtained by chart check and family as patient is currently intubated.   She had been feeling poorly for approx one week. Thought she had de Flu but testing was negative. Per , she felt worse yesterday and went to bed early. Her  found her unresponsive and ems called. She was found to be hypoglycemic (she has no history of DM) which was treated. She never lost pulse. In ED she was hypotensive and found to have elevated liver enzymes, Cr up to 4 (baseline less than 1.0) and metabolic acidosis with ph 7.1, CO2 of 10 and lactic acid of 14.   There was also question about possible accidental overdose of pain meds, but so far tylenol level is normal.  She was intubated in ED. Currently on levophed, bicarb gtt and receiving FFP and NAC.    Review of Systems   ROS unable to obtain due to clinical condition    Past Medical History:   Diagnosis Date   • Abnormal finding in urine    • Acute bronchitis    • Asthma    • Atypical chest pain    • Breast cancer screening    • Bunion    • Cough    • Diabetes mellitus    • Diverticulosis    • Dyslipidemia 03/04/2015   • Eczema 05/14/2014    xerotic   • Edema 08/09/2015   • External hemorrhoids    • Fatigue    • Foramen ovale    • Headache     cerebellar   • High risk medication use 03/04/2015    hydrocodone   • Hyperglycemia    • Hyperlipidemia    • Influenza    • Internal hemorrhoids    • Knee osteoarthritis    • Left knee pain    • Nausea    • Obesity    • Osteoporosis    • PAF (paroxysmal atrial fibrillation)    • PFO (patent foramen ovale) 11/17/2014   • Post-menopausal    • Post-operative complication 07/29/2015   • Pre-op testing    •  Preoperative examination    • Psoriasis    • Rhinosinusitis    • Sacroiliitis    • Sebaceous cyst    • Sleep apnea    • Stroke    • UTI (urinary tract infection) 07/29/2015   • Vertigo    • Vitamin B12 deficiency    • Vitamin D deficiency    • Wheezing    ,   Past Surgical History:   Procedure Laterality Date   • BREAST SURGERY      biopsy   • CARDIAC CATHETERIZATION      1995 Dr. Alcazar- mayuri was in 2010   • CHOLECYSTECTOMY     • COLON SURGERY      resection x2   • HYSTERECTOMY     • INCISIONAL BREAST BIOPSY     • LAPAROSCOPIC GASTRIC BANDING     • PATENT FORAMEN OVALE CLOSURE     • TOTAL KNEE ARTHROPLASTY      Right knee   • WRIST SURGERY      carpal tunnel   ,   Family History   Problem Relation Age of Onset   • Family history unknown: Yes   ,   Social History   Substance Use Topics   • Smoking status: Former Smoker     Types: Cigarettes   • Smokeless tobacco: Never Used      Comment: socially in college   • Alcohol use Yes      Comment: social   ,   Prescriptions Prior to Admission   Medication Sig Dispense Refill Last Dose   • diltiazem (TIAZAC) 120 MG 24 hr capsule Take 1 capsule by mouth daily.   Taking   • fluocinonide (LIDEX) 0.05 % cream Apply topically 3 (three) times a day. Apply sparingly to affected areas   Taking   • furosemide (LASIX) 40 MG tablet Take 1 tablet by mouth Daily.   Taking   • gabapentin (NEURONTIN) 300 MG capsule Take 600 mg by mouth.   Taking   • gabapentin (NEURONTIN) 800 MG tablet Take 800 mg by mouth 3 (Three) Times a Day.   Taking   • metoprolol succinate XL (TOPROL-XL) 25 MG 24 hr tablet Take 25 mg by mouth Daily.   Taking   • oxyCODONE-acetaminophen (PERCOCET) 7.5-325 MG per tablet Take 1-2 tablets by mouth As Needed.   Taking   • promethazine (PHENERGAN) 25 MG tablet Take 25 mg by mouth As Needed.   Taking   , Scheduled Meds:    acetylcysteine 50 mg/kg Intravenous Once   acetylcysteine 100 mg/kg Intravenous Once   aztreonam 1 g Intravenous Q8H   famotidine 20 mg Intravenous  Daily   sodium chloride 10 mL Intracatheter Q12H   Vancomycin Pharmacy Intermittent Dosing  Does not apply Daily   , Continuous Infusions:    dextrose 5 % and sodium chloride 0.9 % 100 mL/hr Last Rate: Stopped (03/12/18 1341)   norepinephrine (LEVOPHED) infusion 16 mcg/mL (4 mg/250 mL) infusion 0.02-0.3 mcg/kg/min Last Rate: 0.3 mcg/kg/min (03/12/18 1751)   Pharmacy to dose vancomycin     phenylephrine 0.5-3 mcg/kg/min    sodium bicarbonate drip (greater than 75 mEq/bag) 200 mL/hr Last Rate: 200 mL/hr (03/12/18 1736)   vasopressin 0.03 Units/min    , PRN Meds:  LORazepam  •  Pharmacy to dose vancomycin  •  sodium chloride  •  sodium chloride  •  sodium chloride  •  sodium chloride  •  sodium chloride  •  vasopressin and Allergies:  Erythromycin; Ketorolac; Morphine and related; Sulfa antibiotics; and Penicillins    Objective     Vital Signs  Temp:  [95.9 °F (35.5 °C)-97.4 °F (36.3 °C)] 97.4 °F (36.3 °C)  Heart Rate:  [] 108  Resp:  [26-39] 39  BP: ()/(0-144) 194/144  FiO2 (%):  [100 %] 100 %    No intake/output data recorded.  I/O last 3 completed shifts:  In: 5326 [I.V.:3535; IV Piggyback:1791]  Out: 275 [Urine:275]    Physical Exam  Physical Examination: critically ill.   Mental status - unresponsive  Eyes -normal sclera  Mouth - mucous membranes moist, pharynx normal without lesions and ETT in place  Chest - clear to auscultation, no wheezes, rales or rhonchi, symmetric air entry, tachypnea, on mechanical ventilation.   Heart - regular rhythm, tachycardic no g/r  Abdomen - soft, nontender, nondistended, no masses or organomegaly  + BS  Neurological - not examined  Musculoskeletal - no muscular tenderness noted  Extremities - peripheral pulses normal, no pedal edema, no clubbing or cyanosis  Skin - normal coloration and turgor, no rashes, no suspicious skin lesions noted    Results Review:    I reviewed the patient's new clinical results.    WBC WBC   Date Value Ref Range Status   03/12/2018 26.97 (H)  4.50 - 10.70 10*3/mm3 Final      HGB Hemoglobin   Date Value Ref Range Status   03/12/2018 12.3 11.9 - 15.5 g/dL Final      HCT Hematocrit   Date Value Ref Range Status   03/12/2018 37.6 35.6 - 45.5 % Final      Platlets No results found for: LABPLAT   MCV MCV   Date Value Ref Range Status   03/12/2018 94.0 80.5 - 98.2 fL Final          Sodium Sodium   Date Value Ref Range Status   03/12/2018 149 (H) 136 - 145 mmol/L Final   03/12/2018 145 136 - 145 mmol/L Final      Potassium Potassium   Date Value Ref Range Status   03/12/2018 3.6 3.5 - 5.2 mmol/L Final   03/12/2018 3.2 (L) 3.5 - 5.2 mmol/L Final      Chloride Chloride   Date Value Ref Range Status   03/12/2018 107 98 - 107 mmol/L Final   03/12/2018 97 (L) 98 - 107 mmol/L Final      CO2 CO2   Date Value Ref Range Status   03/12/2018 10.2 (L) 22.0 - 29.0 mmol/L Final   03/12/2018 9.5 (L) 22.0 - 29.0 mmol/L Final      BUN BUN   Date Value Ref Range Status   03/12/2018 21 8 - 23 mg/dL Final   03/12/2018 23 8 - 23 mg/dL Final      Creatinine Creatinine   Date Value Ref Range Status   03/12/2018 4.05 (H) 0.57 - 1.00 mg/dL Final   03/12/2018 4.26 (H) 0.57 - 1.00 mg/dL Final      Calcium Calcium   Date Value Ref Range Status   03/12/2018 6.0 (L) 8.6 - 10.5 mg/dL Final   03/12/2018 7.3 (L) 8.6 - 10.5 mg/dL Final      PO4 No results found for: CAPO4   Albumin Albumin   Date Value Ref Range Status   03/12/2018 2.20 (L) 3.50 - 5.20 g/dL Final   03/12/2018 3.00 (L) 3.50 - 5.20 g/dL Final      Magnesium No results found for: MG   Uric Acid No results found for: URICACID         Assessment/Plan     Active Problems:    Hypoglycemia      Assessment & Plan  1. PARESH  - oliguric  - likely ATN from shock/sepsis  - check urine electrolytes  - CT abd/pelivs show normal kidneys with no hydronephrosis  - waste products and electrolytes stable, but has lactic aciosis with ph of 7.1  - had long discussion with family, including risk of doing dialysis, will proceed with 6 hours of SLED  -  renally dose medications for GFR <10, avoid IV contrast if possible, avoid nephrotoxins    2. Elevated AG metabolic acidosis  - due to tissue hypoperfusion and shock liver  - CT abdomen and pelvis with no evidence of ischemia or infection  - currently on bicarb gtt  - will continue and hold once patinet starts SLED  - sled for severe metabolic acidosis    3. Septic shock  - possibly from pna, ct scan suggesting ARDS  - patent had dental work done fairly recently, possible source  - on abx per primary team  - cultures pending  - currently on levophed  - no UF with SLED    4. Hypocalcemia  - worsening possibly due to correction of metabolic acidosis  - will replete  - will check electolytes with HD    5. Hypokalemia  - better, will decrased as metabolic acidosis improves  - will correct with RRT, serial labs    6. Hypernatremia  - due to decreaese free water intake and insensible losses; tachypnea  - can add 1/2 ns or D5W (without bicarb) at 75 ml/hr   - serial labs    7. Respiratory failure  - on mechanical ventilation  - per pulm/ICU team    Thank you for this referral.     I discussed the patients findings and my recommendations with family and nursing staff.  willing to give about 24 hours to see any improvement. I discussed with them the risk and benefits of doing RRT. We agree at the first sign she is not tolerating will stop HD. They are aware that hd will do not fix underlying problem.     Dahlia Albert MD  03/12/18  7:42 PM    Time: Critical care 40 min

## 2018-03-13 PROBLEM — R57.9 SHOCK (HCC): Status: ACTIVE | Noted: 2018-01-01

## 2018-03-13 PROBLEM — R79.89 ELEVATED LFTS: Status: ACTIVE | Noted: 2018-01-01

## 2018-03-13 NOTE — PROGRESS NOTES
"   LOS: 1 day   Patient Care Team:  Aura Turcios MD as PCP - General  Aura Turcios MD as PCP - Family Medicine  Pratik Hung MD as PCP - Claims Attributed    Chief Complaint/ Reason for encounter: Acute renal failure, need for continuous renal replacement therapy, electrolyte management  Chief Complaint   Patient presents with   • Hypoglycemia         Subjective     History of Present Illness    Subjective:  Symptoms:  Worsening.  She reports weakness.  No shortness of breath or chest pain.  (Patient remains critically ill this morning, on pressors.  Patient was seen on slow low efficiency dialysis, tolerating well so far.  Family is aware of the severity of her condition and her poor prognosis  Discussed with family at length, goals of care discussed, she is a conditional code, no escalation of care is desired).    Diet:  NPO.    Activity level: Impaired due to weakness.    Pain:  She reports no pain.          History taken from: Patient and chart    Objective     Vital Signs  Temp:  [95.9 °F (35.5 °C)-98.8 °F (37.1 °C)] 98.1 °F (36.7 °C)  Heart Rate:  [] 112  Resp:  [26-46] 46  BP: ()/(0-144) 105/66  Arterial Line BP: (103-158)/(43-78) 139/54  FiO2 (%):  [70 %-100 %] 70 %       Wt Readings from Last 1 Encounters:   03/13/18 0240 94.9 kg (209 lb 3.5 oz)   03/12/18 1049 81.6 kg (180 lb)       Objective:  General Appearance:  Comfortable, ill-appearing, in no acute distress and not in pain.    Vital signs: (most recent): Blood pressure (!) 100/35, pulse 112, temperature 98.1 °F (36.7 °C), resp. rate (!) 46, height 160 cm (62.99\"), weight 94.9 kg (209 lb 3.5 oz), SpO2 99 %.  No fever.  (Hypotensive on pressors, tachycardic).    Output: Minimal urine output (Dark urine).    HEENT: (Endotracheal tube in place)    Lungs:  Normal effort and tachypnea.  Breath sounds clear to auscultation.  She is not in respiratory distress.  There are decreased breath sounds.  No rales or rhonchi.    Heart: " Tachycardia.  Regular rhythm.  S1 normal.  No murmur.   Abdomen: Abdomen is soft and non-distended.  There are no signs of ascites.  Hypoactive bowel sounds.   There is no abdominal tenderness.     Extremities: Normal range of motion.  There is no deformity or dependent edema.    Pulses: Distal pulses are intact.    Skin:  Warm and dry.  No rash or cyanosis.             Results Review:    Past Medical History: reviewed and updated  Past Medical History:   Diagnosis Date   • Abnormal finding in urine    • Acute bronchitis    • Asthma    • Atypical chest pain    • Breast cancer screening    • Bunion    • Cough    • Diabetes mellitus    • Diverticulosis    • Dyslipidemia 03/04/2015   • Eczema 05/14/2014    xerotic   • Edema 08/09/2015   • External hemorrhoids    • Fatigue    • Foramen ovale    • Headache     cerebellar   • High risk medication use 03/04/2015    hydrocodone   • Hyperglycemia    • Hyperlipidemia    • Influenza    • Internal hemorrhoids    • Knee osteoarthritis    • Left knee pain    • Nausea    • Obesity    • Osteoporosis    • PAF (paroxysmal atrial fibrillation)    • PFO (patent foramen ovale) 11/17/2014   • Post-menopausal    • Post-operative complication 07/29/2015   • Pre-op testing    • Preoperative examination    • Psoriasis    • Rhinosinusitis    • Sacroiliitis    • Sebaceous cyst    • Sleep apnea    • Stroke    • UTI (urinary tract infection) 07/29/2015   • Vertigo    • Vitamin B12 deficiency    • Vitamin D deficiency    • Wheezing          Allergies:  Allergies   Allergen Reactions   • Erythromycin Hives   • Ketorolac Itching   • Morphine And Related Itching and Swelling   • Sulfa Antibiotics    • Penicillins Rash       Intake/Output:     Intake/Output Summary (Last 24 hours) at 03/13/18 0823  Last data filed at 03/13/18 0715   Gross per 24 hour   Intake           8929.4 ml   Output              584 ml   Net           8345.4 ml         DATA:  Radiology and Labs:  The following labs independently  reviewed by me.  Interval notes, chart personally reviewed by me.   Old records independently reviewed showing Normal prior renal function prior to admission  The following radiologic studies independently viewed by me, findings chest x-ray with vascular congestion, ARDS type picture  New problems include renal failure, liver failure, respiratory failure, hypotensive shock on pressors  Discussed with family, RN      Risk/ complexity of medical care/ medical decision making extremely high given the complex comorbidities, need for renal replacement therapy, critical care    Patient is critically ill and I was called emergently to see due to the critical nature of the illness.  The acute nature of the renal failure carries a high probability of imminent and life-threatening deterioration in the patient's clinical condition if the renal failure is not aggressively treated with dialysis and aggressive monitoring and correction of electrolytes.  Medical decision making is highly complex due to the critical nature of the patient's illness, need for ICU care, coordination of care.  Management of her renal failure requires careful assessment, manipulation and support of vital organ system functions to treat acute renal failure and prevent further deterioration of the patient's critical condition.  This consisted of a thorough review of pertinent laboratory tests, diagnostic tests, medical records and conversations with other physicians and medical staff directly involved in the patient care.  Care was discussed with family members as patient's critical nature makes did impossible to obtain adequate history from the patient.  Total critical care time spent was 40 minutes, from 9 AM to 9:40 AM                        Labs:   Recent Results (from the past 24 hour(s))   POC Glucose Once    Collection Time: 03/12/18 10:41 AM   Result Value Ref Range    Glucose 102 70 - 130 mg/dL   POC Glucose Once    Collection Time: 03/12/18  11:00 AM   Result Value Ref Range    Glucose 103 70 - 130 mg/dL   Comprehensive Metabolic Panel    Collection Time: 03/12/18 11:17 AM   Result Value Ref Range    Glucose 266 (H) 65 - 99 mg/dL    BUN 23 8 - 23 mg/dL    Creatinine 4.26 (H) 0.57 - 1.00 mg/dL    Sodium 145 136 - 145 mmol/L    Potassium 3.2 (L) 3.5 - 5.2 mmol/L    Chloride 97 (L) 98 - 107 mmol/L    CO2 9.5 (L) 22.0 - 29.0 mmol/L    Calcium 7.3 (L) 8.6 - 10.5 mg/dL    Total Protein 5.8 (L) 6.0 - 8.5 g/dL    Albumin 3.00 (L) 3.50 - 5.20 g/dL    ALT (SGPT) 5,214 (H) 1 - 33 U/L    AST (SGOT) >7,000 (H) 1 - 32 U/L    Alkaline Phosphatase 278 (H) 39 - 117 U/L    Total Bilirubin 4.5 (H) 0.1 - 1.2 mg/dL    eGFR Non African Amer 10 (L) >60 mL/min/1.73    Globulin 2.8 gm/dL    A/G Ratio 1.1 g/dL    BUN/Creatinine Ratio 5.4 (L) 7.0 - 25.0    Anion Gap 38.5 mmol/L   Lactic Acid, Plasma    Collection Time: 03/12/18 11:17 AM   Result Value Ref Range    Lactate 14.7 (C) 0.5 - 2.0 mmol/L   Blood Culture - Blood,    Collection Time: 03/12/18 11:17 AM   Result Value Ref Range    Blood Culture No growth at less than 24 hours    CBC Auto Differential    Collection Time: 03/12/18 11:17 AM   Result Value Ref Range    WBC 26.97 (H) 4.50 - 10.70 10*3/mm3    RBC 4.00 3.90 - 5.20 10*6/mm3    Hemoglobin 12.3 11.9 - 15.5 g/dL    Hematocrit 37.6 35.6 - 45.5 %    MCV 94.0 80.5 - 98.2 fL    MCH 30.8 26.9 - 32.0 pg    MCHC 32.7 32.4 - 36.3 g/dL    RDW 16.7 (H) 11.7 - 13.0 %    RDW-SD 55.4 (H) 37.0 - 54.0 fl    MPV 9.2 6.0 - 12.0 fL    Platelets 451 140 - 500 10*3/mm3   Scan Slide    Collection Time: 03/12/18 11:17 AM   Result Value Ref Range    Scan Slide     Lactic Acid, Reflex Timer (This will reflex a repeat order 3-3:15 hours after ordered.)    Collection Time: 03/12/18 11:17 AM   Result Value Ref Range    Extra Tube Hold for add-ons.    Manual Differential    Collection Time: 03/12/18 11:17 AM   Result Value Ref Range    Neutrophil % 87.0 (H) 42.7 - 76.0 %    Lymphocyte % 8.0 (L)  19.6 - 45.3 %    Monocyte % 5.0 5.0 - 12.0 %    Neutrophils Absolute 23.46 (H) 1.90 - 8.10 10*3/mm3    Lymphocytes Absolute 2.16 0.90 - 4.80 10*3/mm3    Monocytes Absolute 1.35 (H) 0.20 - 1.20 10*3/mm3    Anisocytosis Mod/2+ None Seen    Margie Cells Mod/2+ None Seen    Schistocytes Slight/1+ None Seen    Vacuolated Neutrophils Slight/1+ None Seen    Platelet Morphology Normal Normal   Acetaminophen Level    Collection Time: 03/12/18 11:17 AM   Result Value Ref Range    Acetaminophen <5.0 (L) 10.0 - 30.0 mcg/mL   CK    Collection Time: 03/12/18 11:17 AM   Result Value Ref Range    Creatine Kinase 1,807 (H) 20 - 180 U/L   CK-MB    Collection Time: 03/12/18 11:17 AM   Result Value Ref Range    CKMB 96.83 (H) <=5.30 ng/mL   Urinalysis With / Culture If Indicated - Urine, Catheter    Collection Time: 03/12/18 11:26 AM   Result Value Ref Range    Color, UA Dark Yellow (A) Yellow, Straw    Appearance, UA Cloudy (A) Clear    pH, UA 5.5 5.0 - 8.0    Specific Gravity, UA 1.020 1.005 - 1.030    Glucose, UA Negative Negative    Ketones, UA Negative Negative    Bilirubin, UA Negative Negative    Blood, UA Negative Negative    Protein, UA 30 mg/dL (1+) (A) Negative    Leuk Esterase, UA Small (1+) (A) Negative    Nitrite, UA Negative Negative    Urobilinogen, UA 1.0 E.U./dL 0.2 - 1.0 E.U./dL   Urinalysis, Microscopic Only - Urine, Clean Catch    Collection Time: 03/12/18 11:26 AM   Result Value Ref Range    RBC, UA 0-2 None Seen, 0-2 /HPF    WBC, UA 0-2 None Seen, 0-2 /HPF    Bacteria, UA None Seen None Seen /HPF    Squamous Epithelial Cells, UA 7-12 (A) None Seen, 0-2 /HPF    Hyaline Casts, UA 0-2 None Seen /LPF    Methodology Manual Light Microscopy    Protime-INR    Collection Time: 03/12/18 12:00 PM   Result Value Ref Range    Protime 57.8 (C) 11.7 - 14.2 Seconds    INR 6.76 (C) 0.90 - 1.10   Blood Gas, Arterial    Collection Time: 03/12/18 12:27 PM   Result Value Ref Range    Site Arterial: left radial     Carlos's Test  Positive     pH, Arterial 7.265 (C) 7.350 - 7.450 pH units    pCO2, Arterial <14.4 (C) 35.0 - 45.0 mm Hg    pO2, Arterial 77.8 (L) 80.0 - 100.0 mm Hg    HCO3, Arterial 6.1 (L) 22.0 - 28.0 mmol/L    Base Excess, Arterial -18.5 (L) 0.0 - 2.0 mmol/L    O2 Saturation Calculated 94.0 92.0 - 99.0 %    Barometric Pressure for Blood Gas 755.6 mmHg    Modality Room Air     Rate 28 Breaths/minute   Blood Culture - Blood,    Collection Time: 03/12/18 12:49 PM   Result Value Ref Range    Blood Culture No growth at less than 24 hours    POC Glucose Once    Collection Time: 03/12/18  3:14 PM   Result Value Ref Range    Glucose 172 (H) 70 - 130 mg/dL   Lactic Acid, Reflex    Collection Time: 03/12/18  4:58 PM   Result Value Ref Range    Lactate 13.6 (C) 0.5 - 2.0 mmol/L   Comprehensive Metabolic Panel    Collection Time: 03/12/18  4:58 PM   Result Value Ref Range    Glucose 143 (H) 65 - 99 mg/dL    BUN 21 8 - 23 mg/dL    Creatinine 4.05 (H) 0.57 - 1.00 mg/dL    Sodium 149 (H) 136 - 145 mmol/L    Potassium 3.6 3.5 - 5.2 mmol/L    Chloride 107 98 - 107 mmol/L    CO2 10.2 (L) 22.0 - 29.0 mmol/L    Calcium 6.0 (L) 8.6 - 10.5 mg/dL    Total Protein 4.4 (L) 6.0 - 8.5 g/dL    Albumin 2.20 (L) 3.50 - 5.20 g/dL    ALT (SGPT) 3,690 (H) 1 - 33 U/L    AST (SGOT) 5,612 (H) 1 - 32 U/L    Alkaline Phosphatase 210 (H) 39 - 117 U/L    Total Bilirubin 3.5 (H) 0.1 - 1.2 mg/dL    eGFR Non African Amer 11 (L) >60 mL/min/1.73    Globulin 2.2 gm/dL    A/G Ratio 1.0 g/dL    BUN/Creatinine Ratio 5.2 (L) 7.0 - 25.0    Anion Gap 31.8 mmol/L   Type & Screen    Collection Time: 03/12/18  4:58 PM   Result Value Ref Range    ABO Type O     RH type Positive     Antibody Screen Negative    Salicylate Level    Collection Time: 03/12/18  4:58 PM   Result Value Ref Range    Salicylate 1.7 mg/dL   Blood Gas, Arterial    Collection Time: 03/12/18  5:17 PM   Result Value Ref Range    Site Arterial: left femoral     Carlos's Test N/A     pH, Arterial 7.137 (C) 7.350 -  7.450 pH units    pCO2, Arterial 27.6 (L) 35.0 - 45.0 mm Hg    pO2, Arterial 193.6 (H) 80.0 - 100.0 mm Hg    HCO3, Arterial 9.4 (L) 22.0 - 28.0 mmol/L    Base Excess, Arterial -18.2 (L) 0.0 - 2.0 mmol/L    O2 Saturation Calculated 99.3 (H) 92.0 - 99.0 %    A-a Gradiant 0.3 mmHg    Barometric Pressure for Blood Gas 751.4 mmHg    Modality Adult Vent     FIO2 100 %    Ventilator Mode PC     Set [facility] Resp Rate 18     Rate 34 Breaths/minute    PEEP 5    POC Glucose Once    Collection Time: 03/12/18  7:43 PM   Result Value Ref Range    Glucose 146 (H) 70 - 130 mg/dL   Prepare Fresh Frozen Plasma, 2 Units    Collection Time: 03/12/18  8:28 PM   Result Value Ref Range    Product Code L9874YO9     Unit Number [id number]     UNIT  ABO O     UNIT  RH POS     Dispense Status IS     Blood Type OPOS     Blood Expiration Date 569959331781     Blood Type Barcode 5100     Product Code L2629M01     Unit Number [id number]     UNIT  ABO O     UNIT  RH POS     Dispense Status IS     Blood Type OPOS     Blood Expiration Date 624611407646     Blood Type Barcode 5100    Hepatitis Panel, Acute    Collection Time: 03/12/18  9:32 PM   Result Value Ref Range    Hepatitis B Surface Ag Non-Reactive Non-Reactive    Hep A IgM Non-Reactive Non-Reactive    Hep B C IgM Non-Reactive Non-Reactive    Hepatitis C Ab Non-Reactive Non-Reactive   Protime-INR    Collection Time: 03/12/18  9:32 PM   Result Value Ref Range    Protime 32.9 (H) 11.7 - 14.2 Seconds    INR 3.28 (H) 0.90 - 1.10   Phosphorus    Collection Time: 03/12/18 10:07 PM   Result Value Ref Range    Phosphorus 6.3 (H) 2.5 - 4.5 mg/dL   Urine Drug Screen - Urine, Clean Catch    Collection Time: 03/12/18 10:56 PM   Result Value Ref Range    Amphet/Methamphet, Screen Positive (A) Negative    Barbiturates Screen, Urine Negative Negative    Benzodiazepine Screen, Urine Negative Negative    Cocaine Screen, Urine Negative Negative    Opiate Screen Positive (A) Negative    THC, Screen,  Urine Negative Negative    Methadone Screen, Urine Negative Negative    Oxycodone Screen, Urine Positive (A) Negative   Creatinine, Urine, Random - Urine, Clean Catch    Collection Time: 03/12/18 10:56 PM   Result Value Ref Range    Creatinine, Urine 92.6 mg/dL   Sodium, Urine, Random - Urine, Clean Catch    Collection Time: 03/12/18 10:56 PM   Result Value Ref Range    Sodium, Urine 42 mmol/L   Urea Nitrogen, Urine - Urine, Clean Catch    Collection Time: 03/12/18 10:56 PM   Result Value Ref Range    Urea Nitrogen, Urine 151 mg/dL   POC Glucose Once    Collection Time: 03/12/18 11:13 PM   Result Value Ref Range    Glucose 201 (H) 70 - 130 mg/dL   Renal Function Panel    Collection Time: 03/13/18  1:19 AM   Result Value Ref Range    Glucose 256 (H) 65 - 99 mg/dL    BUN 24 (H) 8 - 23 mg/dL    Creatinine 3.87 (H) 0.57 - 1.00 mg/dL    Sodium 146 (H) 136 - 145 mmol/L    Potassium 2.8 (L) 3.5 - 5.2 mmol/L    Chloride 97 (L) 98 - 107 mmol/L    CO2 12.2 (L) 22.0 - 29.0 mmol/L    Calcium 6.1 (L) 8.6 - 10.5 mg/dL    Albumin 2.50 (L) 3.50 - 5.20 g/dL    Phosphorus 5.5 (H) 2.5 - 4.5 mg/dL    Anion Gap 36.8 mmol/L    BUN/Creatinine Ratio 6.2 (L) 7.0 - 25.0    eGFR Non African Amer 11 (L) >60 mL/min/1.73   Magnesium    Collection Time: 03/13/18  1:19 AM   Result Value Ref Range    Magnesium 1.9 1.6 - 2.4 mg/dL   Calcium, Ionized    Collection Time: 03/13/18  1:19 AM   Result Value Ref Range    Ionized Calcium 0.79 (L) 1.15 - 1.35 mmol/L    Ionized Calcium 3.2 (L) 4.6 - 5.4 mg/dL   POC Glucose Once    Collection Time: 03/13/18  3:16 AM   Result Value Ref Range    Glucose 263 (H) 70 - 130 mg/dL   Vancomycin, Random    Collection Time: 03/13/18  6:07 AM   Result Value Ref Range    Vancomycin Random 16.60 5.00 - 40.00 mcg/mL   Renal Function Panel    Collection Time: 03/13/18  6:07 AM   Result Value Ref Range    Glucose 152 (H) 65 - 99 mg/dL    BUN 15 8 - 23 mg/dL    Creatinine 2.51 (H) 0.57 - 1.00 mg/dL    Sodium 146 (H) 136 - 145  mmol/L    Potassium 2.9 (L) 3.5 - 5.2 mmol/L    Chloride 97 (L) 98 - 107 mmol/L    CO2 21.8 (L) 22.0 - 29.0 mmol/L    Calcium 6.8 (L) 8.6 - 10.5 mg/dL    Albumin 2.80 (L) 3.50 - 5.20 g/dL    Phosphorus 3.3 2.5 - 4.5 mg/dL    Anion Gap 27.2 mmol/L    BUN/Creatinine Ratio 6.0 (L) 7.0 - 25.0    eGFR Non African Amer 19 (L) >60 mL/min/1.73   Magnesium    Collection Time: 03/13/18  6:07 AM   Result Value Ref Range    Magnesium 2.2 1.6 - 2.4 mg/dL   Protime-INR    Collection Time: 03/13/18  6:07 AM   Result Value Ref Range    Protime 31.4 (H) 11.7 - 14.2 Seconds    INR 3.09 (H) 0.90 - 1.10   CBC (No Diff)    Collection Time: 03/13/18  6:07 AM   Result Value Ref Range    WBC 31.87 (C) 4.50 - 10.70 10*3/mm3    RBC 3.57 (L) 3.90 - 5.20 10*6/mm3    Hemoglobin 10.9 (L) 11.9 - 15.5 g/dL    Hematocrit 32.5 (L) 35.6 - 45.5 %    MCV 91.0 80.5 - 98.2 fL    MCH 30.5 26.9 - 32.0 pg    MCHC 33.5 32.4 - 36.3 g/dL    RDW 16.7 (H) 11.7 - 13.0 %    RDW-SD 53.7 37.0 - 54.0 fl    MPV 9.1 6.0 - 12.0 fL    Platelets 227 140 - 500 10*3/mm3   POC Glucose Once    Collection Time: 03/13/18  7:13 AM   Result Value Ref Range    Glucose 137 (H) 70 - 130 mg/dL       Radiology:  Imaging Results (last 24 hours)     Procedure Component Value Units Date/Time    XR Chest Post CVA Port [[id number]] Collected:  03/13/18 0114     Updated:  03/13/18 0118    Narrative:       PORTABLE CHEST X-RAY     CLINICAL HISTORY: central line placement; E16.2-Hypoglycemia,  unspecified; A41.9-Sepsis, unspecified organism; R65.21-Severe sepsis  with septic shock; N28.9-Disorder of kidney and ureter, unspecified;  K72.00-Acute and subacute hepatic failure without coma; R41.82-Altered  mental status, unspecified; R79.1-Abnormal coagulation profile     COMPARISON: March 12, 2018.     FINDINGS: Portable AP view of the chest was obtained with overlying  monitor leads in place. ET tube has been retracted now residing at the  level of the mid thoracic trachea. Right IJ central line  has been placed  and terminates at the level of the SVC. No demonstrable pneumothorax.  Right diaphragm is elevated, unchanged. Overall, there has been an  increase in mostly groundglass opacities bilaterally. These findings may  be related to edema, pneumonia, or ARDS. No significant pleural fluid.  Normal heart size.             Impression:       1. Life support line changes as above without pneumothorax        This report was finalized on 3/13/2018 1:15 AM by Mitchell Olsen MD.       CT Chest Without Contrast [[id number]] Collected:  03/12/18 1858     Updated:  03/12/18 1911    Narrative:       CT CHEST, ABDOMEN AND PELVIS WITHOUT CONTRAST     HISTORY: 72-year-old female with pneumonia. Abdominal pain.  Gastroenteritis. Colitis is suspected. Sepsis.      TECHNIQUE: CT chest, abdomen and pelvis without IV or oral contrast.     COMPARISON: CT abdomen and pelvis with and without contrast 06/27/2014.  Correlation with chest x-ray 03/12/2018.     FINDINGS:  Chest: Endotracheal tube is present. Mildly enlarged mediastinal lymph  nodes are present. Right paratracheal lymph node just below the level of  the lucio measures 14 mm. Subcarinal lymph node measures 13 mm AP  dimension. These are most likely reactive nodes. Bilateral hazy  groundglass opacities are present throughout the upper and lower lobes.  There is more focal patchy airspace disease within the posterior upper  lobes and both lower lobes. Trace of pleural fluid is present. There is  no loculated effusion or evidence for abscess. Foramen ovale closure  device is evident.     Abdomen/pelvis: There is a gastric band. Moderate gastric distention is  present with air and fluid. A great deal of beam hardening artifact  overlies the upper abdomen as the patient was scanned with her arms to  her side. This contributes to beam hardening artifact. The liver,  spleen, adrenal glands, pancreas appear grossly normal. Kidneys appear  within normal limits and there is no  hydronephrosis. There is mild  colonic distention with air and stool. No abnormal bowel wall thickening  is present. There is no ascites. Sigmoid diverticulosis is present  without evidence for diverticulitis. A right femoral venous catheter is  present with tip extending into the right external iliac vein. A Hayes  catheter is present within the decompressed urinary bladder. There has  been hysterectomy.       Impression:       1. Extensive bilateral groundglass opacities with superimposed patchy  airspace disease both upper and lower lobes. This may represent  interstitial and patchy alveolar pulmonary edema though ARDS or  infiltrate superimposed on interstitial edema or infiltrates are also  considerations. Trace pleural fluid is present.  2. Cardiomegaly. Foramen ovale closure device is present.  3. Gastric band is present. Cholecystectomy clips.  4. Moderate gastric distention with air and fluid. Mild colonic  distention without evidence for bowel obstruction or ascites or abscess  formation.   5. Right common femoral venous catheter tip in the right external iliac  vein. Hayes catheter is in a decompressed urinary bladder.      Radiation dose reduction techniques were utilized, including automated  exposure control and exposure modulation based on body size.     This report was finalized on 3/12/2018 7:08 PM by Dr. Roberto Rico MD.       CT Abdomen Pelvis Without Contrast [[id number]] Collected:  03/12/18 1858     Updated:  03/12/18 1911    Narrative:       CT CHEST, ABDOMEN AND PELVIS WITHOUT CONTRAST     HISTORY: 72-year-old female with pneumonia. Abdominal pain.  Gastroenteritis. Colitis is suspected. Sepsis.      TECHNIQUE: CT chest, abdomen and pelvis without IV or oral contrast.     COMPARISON: CT abdomen and pelvis with and without contrast 06/27/2014.  Correlation with chest x-ray 03/12/2018.     FINDINGS:  Chest: Endotracheal tube is present. Mildly enlarged mediastinal lymph  nodes are present.  Right paratracheal lymph node just below the level of  the lucio measures 14 mm. Subcarinal lymph node measures 13 mm AP  dimension. These are most likely reactive nodes. Bilateral hazy  groundglass opacities are present throughout the upper and lower lobes.  There is more focal patchy airspace disease within the posterior upper  lobes and both lower lobes. Trace of pleural fluid is present. There is  no loculated effusion or evidence for abscess. Foramen ovale closure  device is evident.     Abdomen/pelvis: There is a gastric band. Moderate gastric distention is  present with air and fluid. A great deal of beam hardening artifact  overlies the upper abdomen as the patient was scanned with her arms to  her side. This contributes to beam hardening artifact. The liver,  spleen, adrenal glands, pancreas appear grossly normal. Kidneys appear  within normal limits and there is no hydronephrosis. There is mild  colonic distention with air and stool. No abnormal bowel wall thickening  is present. There is no ascites. Sigmoid diverticulosis is present  without evidence for diverticulitis. A right femoral venous catheter is  present with tip extending into the right external iliac vein. A Hayes  catheter is present within the decompressed urinary bladder. There has  been hysterectomy.       Impression:       1. Extensive bilateral groundglass opacities with superimposed patchy  airspace disease both upper and lower lobes. This may represent  interstitial and patchy alveolar pulmonary edema though ARDS or  infiltrate superimposed on interstitial edema or infiltrates are also  considerations. Trace pleural fluid is present.  2. Cardiomegaly. Foramen ovale closure device is present.  3. Gastric band is present. Cholecystectomy clips.  4. Moderate gastric distention with air and fluid. Mild colonic  distention without evidence for bowel obstruction or ascites or abscess  formation.   5. Right common femoral venous catheter tip  in the right external iliac  vein. Hayes catheter is in a decompressed urinary bladder.      Radiation dose reduction techniques were utilized, including automated  exposure control and exposure modulation based on body size.     This report was finalized on 3/12/2018 7:08 PM by Dr. Roberto Rico MD.       XR Chest Post CVA Port [[id number]] Collected:  03/12/18 1655     Updated:  03/12/18 1703    Narrative:       XR CHEST POST CVA PORT-     INDICATIONS:    Endotracheal intubation, femoral line placement           TECHNIQUE: FRONTAL VIEW OF THE CHEST     COMPARISON: 3/12/2018 at 1149 hours     FINDINGS:      Endotracheal tube tip extends into the proximal right mainstem  bronchus, advise partially retracting the tube. The heart size is  borderline. Alveolar and interstitial infiltrates in both lungs may  reflect edema and/or infection and represent interval worsening. No  pleural effusion or pneumothorax is seen, limited by supine positioning.  Right hemidiaphragm remains elevated. Otherwise stable.             Impression:          Endotracheal tube tip extends into the proximal right mainstem bronchus,  advise partially retracting the tube. Bilateral alveolar interstitial  infiltrates represent interval worsening.     Discussed by telephone with patient's nurse, Catherine, at time of  interpretation, 1700, 3/12/2018.        This report was finalized on 3/12/2018 5:00 PM by Dr. Clayton Chavarria MD.       CT Head Without Contrast [[id number]] Collected:  03/12/18 1557     Updated:  03/12/18 1557    Narrative:       EMERGENCY NONCONTRAST HEAD CT 03/12/2018     CLINICAL HISTORY: Confusion, septic.     TECHNIQUE: Spiral CT images were obtained from the base of the skull to  the vertex without intravenous contrast and due to motion degradation on  many of the images, repeat spiral CT images were obtained from the base  of the skull to the vertex without contrast and all images were  reformatted and submitted in 3 mm  thick axial CT section with brain  algorithm.      There are no prior studies from [facility] for  comparison.      FINDINGS: Exam is slightly compromised by patient motion artifact even  despite repeating the CT through the head. There is some patchy  low-density in the frontal periventricular white matter consistent with  mild small vessel disease.  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 The  ventricles are normal in size.  There is no midline shift.  No  extra-axial fluid collections are identified and there is no evidence of  acute intracranial hemorrhage. The paranasal sinuses, the mastoid air  cells and the middle ear cavities are clear.       Impression:          1. Exam is slightly compromised by patient motion artifact even despite  repeating the CT through the head.  2. There is mild small vessel disease in the frontal periventricular  white matter.   3. Moderate size area of encephalomalacia, most consistent with an old  infarct throughout the inferior left cerebellum measuring 4.8 x 3.7 cm  in size in the distribution of the inferior cerebellar branches of the  left PICA territory. The remainder of the head CT is within normal  limits. The results were communicated to Dr. Johnson in the emergency  room by telephone 03/12/2018 at 3 PM.      Radiation dose reduction techniques were utilized, including automated  exposure control and exposure modulation based on body size.          XR Chest 1 View [[id number]] Collected:  03/12/18 1201     Updated:  03/12/18 1208    Narrative:       XR CHEST 1 VW-     HISTORY: Female who is 72 years-old,  short of breath     TECHNIQUE: Frontal view of the chest     COMPARISON: 7/23/2015     FINDINGS: Patient is rotated towards the right. Heart size  is  borderline. Mild interstitial prominence. No focal pulmonary  consolidation, pleural effusion or pneumothorax. Right hemidiaphragm is  mildly elevated. No acute osseous process.       Impression:       No focal pulmonary consolidation. Borderline heart size,  with mild prominence of interstitial markings. Follow-up as indications  persist.     This report was finalized on 3/12/2018 12:05 PM by Dr. Clayton Chavarria MD.                Medications have been reviewed:  Current Facility-Administered Medications   Medication Dose Route Frequency Provider Last Rate Last Dose   • acetylcysteine (ACETADOTE) 12,240 mg in dextrose (D5W) 5 % 200 mL infusion  150 mg/kg Intravenous Daily Aydee Cade MD       • acetylcysteine (ACETADOTE) 8,160 mg in dextrose (D5W) 5 % 1,000 mL infusion  100 mg/kg Intravenous Once Ismael Ibarra MD   8,160 mg at 03/12/18 2247   • aztreonam (AZACTAM) 1 g/100 mL 0.9% NS IVPB (mbp)  1 g Intravenous Q8H Ismael Ibarra MD   1 g at 03/13/18 0330   • calcium gluconate 1 g in sodium chloride 0.9 % 50 mL IVPB  1 g Intravenous PRN Dahlia Albert MD        Or   • calcium gluconate 2 g in sodium chloride 0.9 % 50 mL IVPB  2 g Intravenous PRN Dahlia Albert MD       • dextrose (D50W) solution 25 g  25 g Intravenous Q15 Min PRN Sudhakar Rojas MD       • dextrose (GLUTOSE) oral gel 15 g  15 g Oral Q15 Min PRN Sudhakar Rojas MD       • dextrose 5 % and sodium chloride 0.9 % infusion  100 mL/hr Intravenous Continuous Gentry Johnson MD   Stopped at 03/12/18 1341   • famotidine (PEPCID) injection 20 mg  20 mg Intravenous Daily Ismael Ibarra MD   20 mg at 03/12/18 1853   • glucagon (human recombinant) (GLUCAGEN DIAGNOSTIC) injection 1 mg  1 mg Subcutaneous PRN Sudhakar Rojas MD       • heparin (porcine) injection 1,000-2,000 Units  1,000-2,000 Units Intravenous PRN Dahlia Albert MD       • insulin aspart (novoLOG) injection 0-7 Units  0-7 Units Subcutaneous Q4H Sudhakar Rojas MD   4 Units at 03/13/18 0330   •  LORazepam (ATIVAN) injection 2 mg  2 mg Intravenous Q4H PRN Ismael Ibarra MD   2 mg at 03/12/18 1658   • magnesium sulfate in D5W 1g/100mL (PREMIX)  2 g Intravenous PRN Dahlia Albert MD       • norepinephrine (LEVOPHED) infusion 16 mcg/mL (4 mg/250 mL) infusion  0.02-0.3 mcg/kg/min Intravenous Titrated Ismael Ibarra MD 73.4 mL/hr at 03/13/18 0637 0.24 mcg/kg/min at 03/13/18 0637   • Pharmacy to dose vancomycin   Does not apply Continuous PRN Ismael Ibarra MD       • phenylephrine (CAROL-SYNEPHRINE) 50 mg/250 mL (0.2 mg/mL) in 0.9% NS  infusion  0.5-3 mcg/kg/min Intravenous Titrated Sandor Whiting MD   Stopped at 03/12/18 2133   • potassium chloride 20 mEq in 50 mL IVPB  20 mEq Intravenous PRN Dahlia Albert MD       • sodium bicarbonate 8.4 % 150 mEq in dextrose (D5W) 5 % 1,000 mL infusion (greater than 75 mEq)  200 mL/hr Intravenous Continuous Ismael Ibarra MD   Stopped at 03/13/18 0332   • sodium chloride 0.45 % infusion  75 mL/hr Intravenous Continuous Sudhakar Rojas MD 75 mL/hr at 03/13/18 0331 75 mL/hr at 03/13/18 0331   • sodium chloride 0.9 % flush 10 mL  10 mL Intracatheter Q12H Ismael Ibarra MD   10 mL at 03/12/18 2133   • sodium chloride 0.9 % flush 10 mL  10 mL Intracatheter PRN Ismael Ibarra MD       • sodium chloride 0.9 % flush 10 mL  10 mL Intracatheter JENSEN Ibarra MD       • sodium chloride 0.9 % flush 10 mL  10 mL Intracatheter PRN Ismael Ibarra MD       • sodium chloride 0.9 % flush 10 mL  10 mL Intracatheter PRN Ismael Ibarra MD       • sodium chloride 0.9 % flush 10 mL  10 mL Intracatheter PRMAGALI Ibarra MD       • sodium chloride 0.9 % infusion 100 mL  100 mL Intravenous PRN Dahlia Albert MD       • sodium chloride 0.9 % infusion  9 mL/hr Intravenous Continuous Sudhakar Rojas MD 9 mL/hr at 03/13/18 0521 9 mL/hr at 03/13/18 0521   • sodium phosphates 10 mmol in sodium chloride 0.9 % 100 mL IVPB  10 mmol Intravenous PRN Dahlia Albert MD       • Vancomycin Pharmacy Intermittent  Dosing   Does not apply Daily Luis Armando Castro MD       • vasopressin (PITRESSIN) 20 Units in sodium chloride 0.9 % 100 mL (0.2 Units/mL) infusion  0.03 Units/min Intravenous Continuous PRN Sandor Whiting MD           Assessment/Plan     Active Problems:    Hypoglycemia      Assessment:  (Assessment:  1.  Acute renal failure, likely due to severe ATN from shock, sepsis.  Extremely low urine output, remains dialysis dependent.  No evidence of obstruction on imaging  High anion gap metabolic acidosis secondary to lactic acidosis from sepsis and shock liver  Elevated liver enzymes, shock liver versus Tylenol overdose  Septic shock on IV fluids and antibiotics empirically  Hypertension from sepsis/shock  Hypocalcemia due to renal failure, replacing IV  Hypokalemia, should correct with a 4K bath on dialysis, continue to monitor serial labs  Hypernatremia on hypotonic fluids  Acute hypoxemic respiratory failure on ventilator support      Plan:  She remains dialysis dependent, continue SLED for now  Very poor prognosis given suspicion for Tylenol-induced liver toxicity  Continue IV fluids, pressor support, ventilator support per pulmonary  She's currently a conditional code, family wants to continue aggressive care for at least 24 hours then reevaluate  Renally dose all medications and antibiotics for GFR near 0  Replace calcium IV  Continue to monitor volume and electrolytes closely  Patient was seen and examined in the ICU, remains critically ill, discussed with family, RN  Total critical care time spent 40 minutes   ).             Continue to monitor renal function, electroytes and volume closely   Please call me with any questions or concerns      Javed Cifuentes MD   [facility]   [phone]    03/13/18  8:23 AM      Dictation performed using Dragon dictation software

## 2018-03-13 NOTE — PROCEDURES
Procedure: Arterial line    Informed consent obtained from next of kin after explaining risks potential benefits of the procedure.    Timeout completed patient identified by 2 different markers.  Procedure reconfirmed and laterality reconfirmed.    Left groin area cleaned and draped.  After visualization with an ultrasound 1% lidocaine instilled.  Needle inserted into the left femoral artery and using Seldinger technique femoral artery catheter placed.  Catheter was hooked up to a pressure bag with good waveform.  Sutures applied and dressing applied.  No complications of procedure.

## 2018-03-13 NOTE — CONSULTS
Purpose of the visit was to evaluate for: goals of care/advanced care planning, withdrawal of interventions and transfer to comfort care bed/unit. Spoke with RN as well as family and HCS and discussed palliative care, goals of care, care options and resuscitation status.      Assessment:  Patient is palliative care appropriate for inpatient care given multiorgan failure, severe metabolic acidosis with renal failure, intubated, presently getting HD. Not following commands, no pain response per Rn. PPS 10%. GCS 6%    Recommendations/Plan: transfer to [facility] for palliative care after extubation if possible. Spoke with RnAve, she will contact Md.   Other Comments: Met with spouse, son, a cousin and a good friend. The spouse said he wants to remove all tubes and let her pass away naturally. He said they have talked about this in the past and he knows what she would want and not want.  He does not want to wait any longer. Explained that she may not survive long after extubation but she would be kept comfortable. He does not want her to get a liver transplant or even be evaluated for one.     Total time: 30 minutes.

## 2018-03-13 NOTE — PROGRESS NOTES
[facility]  Inpatient Progress Note    Reason for Follow Up:  Acute liver failure, multiorgan failure    Subjective     Interval History:   SLED initiated overnight.  Nurse reports + gag.  Still rapid breathing on vent.  INR responded to blood products.  Slightly decreased dose of Levaquin fed.  Now has an A-line.    Current Facility-Administered Medications:   •  acetylcysteine (ACETADOTE) 12,240 mg in dextrose (D5W) 5 % 200 mL infusion, 150 mg/kg, Intravenous, Daily, Aydee Cade MD  •  acetylcysteine (ACETADOTE) 8,160 mg in dextrose (D5W) 5 % 1,000 mL infusion, 100 mg/kg, Intravenous, Once, Ismael Ibarra MD, 8,160 mg at 03/12/18 2247  •  aztreonam (AZACTAM) 1 g/100 mL 0.9% NS IVPB (mbp), 1 g, Intravenous, Q8H, Ismael Ibarra MD, 1 g at 03/13/18 0330  •  calcium gluconate 1 g in sodium chloride 0.9 % 50 mL IVPB, 1 g, Intravenous, PRN **OR** calcium gluconate 2 g in sodium chloride 0.9 % 50 mL IVPB, 2 g, Intravenous, PRN, Dahlia Albert MD  •  dextrose (D50W) solution 25 g, 25 g, Intravenous, Q15 Min PRN, Sudhakar Rojas MD  •  dextrose (GLUTOSE) oral gel 15 g, 15 g, Oral, Q15 Min PRN, Sudhakar Rojas MD  •  dextrose 5 % and sodium chloride 0.9 % infusion, 100 mL/hr, Intravenous, Continuous, Gentry Johnson MD, Stopped at 03/12/18 1341  •  famotidine (PEPCID) injection 20 mg, 20 mg, Intravenous, Daily, Ismael Ibarra MD, 20 mg at 03/13/18 0828  •  glucagon (human recombinant) (GLUCAGEN DIAGNOSTIC) injection 1 mg, 1 mg, Subcutaneous, PRN, Sudhakar Rojas MD  •  heparin (porcine) injection 1,000-2,000 Units, 1,000-2,000 Units, Intravenous, PRN, Dahlia Albert MD  •  insulin aspart (novoLOG) injection 0-7 Units, 0-7 Units, Subcutaneous, Q4H, Sudhakar Rojas MD, 4 Units at 03/13/18 0330  •  LORazepam (ATIVAN) injection 2 mg, 2 mg, Intravenous, Q4H PRN, Ismael Ibarra MD, 2 mg at 03/12/18 5402  •  magnesium sulfate in D5W 1g/100mL (PREMIX), 2 g, Intravenous, PRN, Dahlia Albert MD  •  norepinephrine  (LEVOPHED) infusion 16 mcg/mL (4 mg/250 mL) infusion, 0.02-0.3 mcg/kg/min, Intravenous, Titrated, Ismael Ibarra MD, Last Rate: 73.4 mL/hr at 03/13/18 0637, 0.24 mcg/kg/min at 03/13/18 0637  •  Pharmacy to dose vancomycin, , Does not apply, Continuous PRN, Ismael Ibarra MD  •  phenylephrine (CAROL-SYNEPHRINE) 50 mg/250 mL (0.2 mg/mL) in 0.9% NS  infusion, 0.5-3 mcg/kg/min, Intravenous, Titrated, Sandor Whiting MD, Stopped at 03/12/18 2133  •  potassium chloride 20 mEq in 50 mL IVPB, 20 mEq, Intravenous, PRN, Dahlia Ablert MD  •  propofol (DIPRIVAN) infusion 10 mg/mL 100 mL, 5-50 mcg/kg/min, Intravenous, Titrated, Ismael Ibarra MD, Last Rate: 11.39 mL/hr at 03/13/18 0858, 20 mcg/kg/min at 03/13/18 0858  •  sodium bicarbonate 8.4 % 150 mEq in dextrose (D5W) 5 % 1,000 mL infusion (greater than 75 mEq), 200 mL/hr, Intravenous, Continuous, Ismael Ibarra MD, Stopped at 03/13/18 0332  •  sodium chloride 0.45 % infusion, 75 mL/hr, Intravenous, Continuous, Sudhakar Rojas MD, Last Rate: 75 mL/hr at 03/13/18 0331, 75 mL/hr at 03/13/18 0331  •  sodium chloride 0.9 % flush 10 mL, 10 mL, Intracatheter, Q12H, Ismael Ibarra MD, 10 mL at 03/13/18 0826  •  sodium chloride 0.9 % flush 10 mL, 10 mL, Intracatheter, PRN, Ismael Ibarra MD  •  sodium chloride 0.9 % flush 10 mL, 10 mL, Intracatheter, PRN, Ismael Ibarra MD  •  sodium chloride 0.9 % flush 10 mL, 10 mL, Intracatheter, PRN, Ismael Ibarra MD  •  sodium chloride 0.9 % flush 10 mL, 10 mL, Intracatheter, PRN, Ismael Ibarra MD  •  sodium chloride 0.9 % flush 10 mL, 10 mL, Intracatheter, PRN, Ismael Ibarra MD  •  sodium chloride 0.9 % infusion 100 mL, 100 mL, Intravenous, PRN, Dahlia Albert MD  •  sodium chloride 0.9 % infusion, 9 mL/hr, Intravenous, Continuous, Sudhakar Rojas MD, Last Rate: 9 mL/hr at 03/13/18 0521, 9 mL/hr at 03/13/18 0521  •  sodium phosphates 10 mmol in sodium chloride 0.9 % 100 mL IVPB, 10 mmol, Intravenous, PRN, Dahlia Albert MD  •  Vancomycin Pharmacy  Intermittent Dosing, , Does not apply, Daily, Luis Armando Castro MD  •  vasopressin (PITRESSIN) 20 Units in sodium chloride 0.9 % 100 mL (0.2 Units/mL) infusion, 0.03 Units/min, Intravenous, Continuous PRN, Sandor Whiting MD  Review of Systems:    Review of systems could not be obtained due to  patient unresponsive. patient intubated.    Objective     Vital Signs  Temp:  [95.9 °F (35.5 °C)-98.8 °F (37.1 °C)] 98.1 °F (36.7 °C)  Heart Rate:  [] 112  Resp:  [26-46] 46  BP: ()/(0-144) 100/35  Arterial Line BP: (103-158)/(43-78) 124/52  FiO2 (%):  [70 %-100 %] 70 %  Body mass index is 37.06 kg/m².    Intake/Output Summary (Last 24 hours) at 03/13/18 0901  Last data filed at 03/13/18 0715   Gross per 24 hour   Intake           8929.4 ml   Output              584 ml   Net           8345.4 ml     I/O this shift:  In: 125.5 [I.V.:125.5]  Out: 17 [Other:17]     Physical Exam:   General: On responsive, not sedated, breathing rapidly over event   Eyes: Normal lids and lashes, no scleral icterus   Neck: supple, normal ROM   Skin: warm and dry, not jaundiced, color   Cardiovascular: Tachycardic, no murmurs auscultated   Pulm: clear to auscultation bilaterally, tachypneic, on vent   Abdomen: soft, obese, nondistended; no organomegaly   Rectal: deferred   Extremities: no rash or edema   Psychiatric: obtunded   Results Review:     I reviewed the patient's new clinical results.      Results from last 7 days  Lab Units 03/13/18  0607 03/12/18  1117   WBC 10*3/mm3 31.87* 26.97*   HEMOGLOBIN g/dL 10.9* 12.3   HEMATOCRIT % 32.5* 37.6   PLATELETS 10*3/mm3 227 451       Results from last 7 days  Lab Units 03/13/18  0607 03/13/18  0119 03/12/18  1658 03/12/18  1117   SODIUM mmol/L 146* 146* 149* 145   POTASSIUM mmol/L 2.9* 2.8* 3.6 3.2*   CHLORIDE mmol/L 97* 97* 107 97*   CO2 mmol/L 21.8* 12.2* 10.2* 9.5*   BUN mg/dL 15 24* 21 23   CREATININE mg/dL 2.51* 3.87* 4.05* 4.26*   CALCIUM mg/dL 6.8* 6.1* 6.0* 7.3*   BILIRUBIN  mg/dL  --   --  3.5* 4.5*   ALK PHOS U/L  --   --  210* 278*   ALT (SGPT) U/L  --   --  3,690* 5,214*   AST (SGOT) U/L  --   --  5,612* >7,000*   GLUCOSE mg/dL 152* 256* 143* 266*       Results from last 7 days  Lab Units 03/13/18  0607 03/12/18  2132 03/12/18  1200   INR  3.09* 3.28* 6.76*       Lab Results  Lab Value Date/Time   LIPASE 36 06/27/2014 1923       Radiology:  CT Abdomen Pelvis Without Contrast   Final Result   1. Extensive bilateral groundglass opacities with superimposed patchy   airspace disease both upper and lower lobes. This may represent   interstitial and patchy alveolar pulmonary edema though ARDS or   infiltrate superimposed on interstitial edema or infiltrates are also   considerations. Trace pleural fluid is present.   2. Cardiomegaly. Foramen ovale closure device is present.   3. Gastric band is present. Cholecystectomy clips.   4. Moderate gastric distention with air and fluid. Mild colonic   distention without evidence for bowel obstruction or ascites or abscess   formation.    5. Right common femoral venous catheter tip in the right external iliac   vein. Hayes catheter is in a decompressed urinary bladder.        Radiation dose reduction techniques were utilized, including automated   exposure control and exposure modulation based on body size.       This report was finalized on 3/12/2018 7:08 PM by Dr. Roberto Rico MD.       Assessment/Plan     Patient Active Problem List   Diagnosis   • Abnormal finding in urine   • Bunion   • Dyslipidemia   • Edema   • Fatigue   • Headache   • Hyperglycemia   • Osteoarthritis of knee   • Knee pain   • Nausea   • Patent foramen ovale   • Postoperative complication   • Psoriasis   • Cerebrovascular accident   • Lower urinary tract infectious disease   • Cobalamin deficiency   • Vitamin D deficiency   • Winter itch   • Hypoglycemia       Impression  1. Acute liver failure: High likelihood of acetaminophen toxicity (most likely taken prior to the  past 3-4 days) but also possibility of sepsis, rhabdo, shock liver.  Acute hep panel negative.     2. Coagulopathy: improved with FFP but still elevated     3. PARESH: on SLED     4. Sepsis: stable with levophed, on antibiotics     5. Respiratory failure: on vent     6. Chronic pain issues: Long standing percocet use,  90 pills given 3/3/18.  Difficult to corroborate history as no one witnessed her taking the medication nor can the pill bottles be found but the scenario is very concerning for acetaminophen toxicity    Plan  -await CMP today  -continue acetylcysteine 150mg/kg daily (discussed this with nurse and pharmacy)  -continue supportive measures  -prognosis remains poor and guarded but still difficult to determine how she will do given that the etiology has not been identified.  Her  seems very anxious to make a decision later this afternoon.  I spoke with him, her son along with Dr. Ibarra, recommending waiting another 24 hours.  Her overall prognosis may not change much but then we may have identified a possible etiology to her liver failure.  Certainly if this is infection leading to septic shock, there would be some reasonable anticipation of recovery.    I discussed the patients findings and my recommendations with family, nursing staff and consulting provider.    Aydee Cade MD

## 2018-03-13 NOTE — PLAN OF CARE
Problem: Patient Care Overview  Goal: Plan of Care Review  Outcome: Ongoing (interventions implemented as appropriate)   03/13/18 0634   Coping/Psychosocial   Plan of Care Reviewed With spouse;family   Plan of Care Review   Progress declining   OTHER   Outcome Summary Pt remains in CCU, intubated, on Levophed, CRRT initiated. HR remains tachy, BP remains labile. Per family, plan remains to give pt 24 hours of treatment to see if any improvement. Will continue to monitor.       Problem: Fall Risk (Adult)  Goal: Absence of Fall  Outcome: Ongoing (interventions implemented as appropriate)      Problem: Skin Injury Risk (Adult)  Goal: Skin Health and Integrity  Outcome: Ongoing (interventions implemented as appropriate)      Problem: Sepsis/Septic Shock (Adult)  Goal: Signs and Symptoms of Listed Potential Problems Will be Absent, Minimized or Managed (Sepsis/Septic Shock)  Outcome: Ongoing (interventions implemented as appropriate)      Problem: Renal Replacement, Continuous (Adult)  Goal: Signs and Symptoms of Listed Potential Problems Will be Absent, Minimized or Managed (Renal Replacement, Continuous)  Outcome: Ongoing (interventions implemented as appropriate)

## 2018-03-13 NOTE — CONSULTS
Patient Name: Jess Barber  :1945  72 y.o.    Date of Admission: 3/12/2018  Date of Consultation:  18  Encounter Provider: Pratik Hung MD  Place of Service: [facility] CARDIOLOGY  Referring Provider: Luis Armando Castro,*  Patient Care Team:  Aura Turcios MD as PCP - General  Aura Turcios MD as PCP - Family Medicine  Pratik Hung MD as PCP - Claims Attributed      Chief complaint: Sepsis    History of Present Illness:  This is a 72 year old female who has a hx of stroke & PFO that was closed many years ago. She did have a stress test in 2016 which was negative for ischemia with an EF of 68%. She was last seen for an office visit on 18 where she had been doing well from a cardiac standpoint and was s/p lap band surgery.     She came into the ER yesterday because she was found unresponsive by her  where EMS was called and it was noted that she was hypoglycemic- D50 was given. When she arrived to the ER, she was hypotensive and started on pressors and needed to get intubated as she was in septic shock, severe metabolic acidosis, PARESH, acute hepatitis, and pneumonia/ARDS.     It was noted that she had recently been to her PCP because she was having flu like symptoms. She was started on tamiflu. She also had a dental implant 4 weeks ago. Her family had reported that she also had been on Percocet for chronic pain, and it seemed that she was more lethargic lately, the percocet bottles were repoted as missing. There was also concern that she had a Tylenol overdose.     On admission, lactic acid was 14 with WBC of 31.87.  We have been consulted for possible endocarditis. Echocardiogram pending.     Dialysis began around 4 am this morning. She is currently on Levophed. No ectopy has been noted- she is in sinus. Per , this is all very sudden and he first noticed her being very lethargic on . He is possibly thinking about making her palliative  this afternoon.  Currently, she is a conditional code.     CT of chest, abd/pelvis on 3/12/18-  IMPRESSION:  1. Extensive bilateral groundglass opacities with superimposed patchy  airspace disease both upper and lower lobes. This may represent  interstitial and patchy alveolar pulmonary edema though ARDS or  infiltrate superimposed on interstitial edema or infiltrates are also  considerations. Trace pleural fluid is present.  2. Cardiomegaly. Foramen ovale closure device is present.  3. Gastric band is present. Cholecystectomy clips.  4. Moderate gastric distention with air and fluid. Mild colonic  distention without evidence for bowel obstruction or ascites or abscess  formation.   5. Right common femoral venous catheter tip in the right external iliac  vein. Hayes catheter is in a decompressed urinary bladder.     CT of head on 3/12/18-  IMPRESSION:  1. Exam is slightly compromised by patient motion artifact even despite  repeating the CT through the head.  2. There is mild small vessel disease in the frontal periventricular  white matter.   3. Moderate size area of encephalomalacia, most consistent with an old  infarct throughout the inferior left cerebellum measuring 4.8 x 3.7 cm  in size in the distribution of the inferior cerebellar branches of the  left PICA territory. The remainder of the head CT is within normal  limits.     Previous Testing-  Stress Test 2/16-      Past Medical History:   Diagnosis Date   • Abnormal finding in urine    • Acute bronchitis    • Asthma    • Atypical chest pain    • Breast cancer screening    • Bunion    • Cough    • Diabetes mellitus    • Diverticulosis    • Dyslipidemia 03/04/2015   • Eczema 05/14/2014    xerotic   • Edema 08/09/2015   • External hemorrhoids    • Fatigue    • Foramen ovale    • Headache     cerebellar   • High risk medication use 03/04/2015    hydrocodone   • Hyperglycemia    • Hyperlipidemia    • Influenza    • Internal hemorrhoids    • Knee osteoarthritis     • Left knee pain    • Nausea    • Obesity    • Osteoporosis    • PAF (paroxysmal atrial fibrillation)    • PFO (patent foramen ovale) 11/17/2014   • Post-menopausal    • Post-operative complication 07/29/2015   • Pre-op testing    • Preoperative examination    • Psoriasis    • Rhinosinusitis    • Sacroiliitis    • Sebaceous cyst    • Sleep apnea    • Stroke    • UTI (urinary tract infection) 07/29/2015   • Vertigo    • Vitamin B12 deficiency    • Vitamin D deficiency    • Wheezing        Past Surgical History:   Procedure Laterality Date   • BREAST SURGERY      biopsy   • CARDIAC CATHETERIZATION      1995 Dr. Alcazar- repeat was in 2010   • CHOLECYSTECTOMY     • COLON SURGERY      resection x2   • HYSTERECTOMY     • INCISIONAL BREAST BIOPSY     • LAPAROSCOPIC GASTRIC BANDING     • PATENT FORAMEN OVALE CLOSURE     • TOTAL KNEE ARTHROPLASTY      Right knee   • WRIST SURGERY      carpal tunnel         Prior to Admission medications    Medication Sig Start Date End Date Taking? Authorizing Provider   diltiazem (TIAZAC) 120 MG 24 hr capsule Take 1 capsule by mouth daily. 5/14/14   Aura Turcios MD   fluocinonide (LIDEX) 0.05 % cream Apply topically 3 (three) times a day. Apply sparingly to affected areas 5/20/14   Aura Turcios MD   furosemide (LASIX) 40 MG tablet Take 1 tablet by mouth Daily. 12/16/14   Aura Turcios MD   gabapentin (NEURONTIN) 300 MG capsule Take 600 mg by mouth. 7/13/17   Historical Provider, MD   gabapentin (NEURONTIN) 800 MG tablet Take 800 mg by mouth 3 (Three) Times a Day.    Historical Provider, MD   metoprolol succinate XL (TOPROL-XL) 25 MG 24 hr tablet Take 25 mg by mouth Daily. 2/10/14   Aura Turcios MD   oxyCODONE-acetaminophen (PERCOCET) 7.5-325 MG per tablet Take 1-2 tablets by mouth As Needed. 6/27/15   Historical Provider, MD   promethazine (PHENERGAN) 25 MG tablet Take 25 mg by mouth As Needed.    Historical Provider, MD       Allergies   Allergen Reactions   •  Erythromycin Hives   • Ketorolac Itching   • Morphine And Related Itching and Swelling   • Sulfa Antibiotics    • Penicillins Rash       Social History     Social History   • Marital status:      Social History Main Topics   • Smoking status: Former Smoker     Types: Cigarettes   • Smokeless tobacco: Never Used      Comment: socially in college   • Alcohol use Yes      Comment: social   • Drug use:      Types: Oxycodone   • Sexual activity: Defer     Other Topics Concern   • Not on file       Family History   Problem Relation Age of Onset   • Family history unknown: Yes       REVIEW OF SYSTEMS:   All systems reviewed.  Pertinent positives identified in HPI.  All other systems are negative.      Objective:     Vitals:    03/13/18 1615 03/13/18 1620 03/13/18 1630 03/13/18 1641   BP:  92/47     Pulse: (!) 124  (!) 125 (!) 125   Resp:       Temp:       TempSrc:       SpO2: 98%  98% 97%   Weight:       Height:         Body mass index is 37.07 kg/m².    General Appearance:    Critically ill, on ventilator and SLED.   Head:    Normocephalic, without obvious abnormality, atraumatic   Eyes:            Lids and lashes normal, conjunctivae and sclerae normal, no   icterus, no pallor, corneas clear, PERRLA   Ears:    Ears appear intact with no abnormalities noted   Throat:   No oral lesions, no thrush, oral mucosa moist   Neck:   No adenopathy, supple, trachea midline, no thyromegaly, no   carotid bruit, no JVD   Back:     No kyphosis present, no scoliosis present, no skin lesions, erythema or scars, no tenderness to percussion or palpation, range of motion normal   Lungs:     Clear to auscultation,respirations regular, even and unlabored    Heart:   Tachy, regular.   Chest Wall:    No abnormalities observed   Abdomen:     Normal bowel sounds, no masses, no organomegaly, soft        non-tender, non-distended, no guarding, no rebound  tenderness   Extremities:   Moves all extremities well, no edema, no cyanosis, no  redness   Pulses:   Pulses palpable and equal bilaterally. Normal radial, carotid, femoral, dorsalis pedis and posterior tibial pulses bilaterally. Normal abdominal aorta   Skin:  Psychiatric:   No bleeding, bruising or rash    Non-responsive.   Lab Review:       Results from last 7 days  Lab Units 03/13/18  1231 03/13/18  0806   SODIUM mmol/L 142 146*   POTASSIUM mmol/L 3.4* 2.8*   CHLORIDE mmol/L 97* 96*   CO2 mmol/L 21.6* 23.9   BUN mg/dL 11 14   CREATININE mg/dL 1.80* 2.33*   CALCIUM mg/dL 7.4* 6.8*   BILIRUBIN mg/dL  --  5.5*   ALK PHOS U/L  --  267*   ALT (SGPT) U/L  --  2,870*   AST (SGOT) U/L  --  3,024*   GLUCOSE mg/dL 82 138*       Results from last 7 days  Lab Units 03/13/18  0806 03/12/18  1117   CK TOTAL U/L 6,654* 1,807*       Results from last 7 days  Lab Units 03/13/18  0607   WBC 10*3/mm3 31.87*   HEMOGLOBIN g/dL 10.9*   HEMATOCRIT % 32.5*   PLATELETS 10*3/mm3 227       Results from last 7 days  Lab Units 03/13/18  0607 03/12/18  2132 03/12/18  1200   INR  3.09* 3.28* 6.76*       Results from last 7 days  Lab Units 03/13/18  1231   MAGNESIUM mg/dL 1.7                 EKG on 3/12/18-    I personally viewed and interpreted the patient's EKG/Telemetry data.        Assessment and Plan:       She is a healthy 72 year old woman with history of PFO, treated in distant past.  Prior stroke, but little residual.     Now with 1 week of flu-like illness.  She had her dental procedure and was on percocet.  This is a possibility of acetominophen overdose.    She now has respiratory failure, liver failure, acute kidney failure.  Possible sepsis.    Though she is critically ill, I would try to give her a little bit of time to try to recover.    I spoke with the family for 1 hour regarding code status and cardiac situation.    She has Gm + cocci in one blood culture.  I will review transthoracic echo to see if she has a vegetation, but I think it's doubtful.    I would cover for Gm + bacteremia just in case.    I  talked about possible GARO for further diagnosis, but at this time, I don't think it will add much to her treatment.    Pratik Hung MD  03/13/18  4:48 PM

## 2018-03-13 NOTE — NURSING NOTE
Dr Rojas notified of CRRT orders with no UF rate, MD stated if pt tolerates treatment to attempt adding small UF rate if pt tolerates.  Will notify HD RN.

## 2018-03-13 NOTE — PLAN OF CARE
Problem: Patient Care Overview  Goal: Plan of Care Review   03/13/18 1814   Coping/Psychosocial   Plan of Care Reviewed With patient;spouse   Plan of Care Review   Progress declining   OTHER   Outcome Summary Pt remains in CCU. Intubated. Vasopressor support with levophed. CRRT ongoing, to be stopped at 1900. Vitals - HR tachy, BP labile, now on 40% FiO2, afebrile. Replacing electrolytes. Plan to reevaluate continuation of care in AM. Continue to monitor.      Goal: Individualization and Mutuality   03/13/18 1814   Individualization   Patient Specific Goals (Include Timeframe) Wean vasopressors. Maintain electrolytes and acid level in blood without dialysis this evening.   Patient Specific Interventions Meds and treatments as ordered. Stop CRRT at 1900. Monitor electrolytes.       Problem: Fall Risk (Adult)  Goal: Identify Related Risk Factors and Signs and Symptoms  Outcome: Outcome(s) achieved Date Met: 03/13/18 03/13/18 1814   Fall Risk (Adult)   Related Risk Factors (Fall Risk) age-related changes;culprit medication(s);fatigue/slow reaction;gait/mobility problems;homeostatic imbalance;history of falls;polypharmacy;sleep pattern alteration;slippery/uneven surfaces;environment unfamiliar   Signs and Symptoms (Fall Risk) presence of risk factors     Goal: Absence of Fall  Outcome: Ongoing (interventions implemented as appropriate)   03/13/18 1814   Fall Risk (Adult)   Absence of Fall making progress toward outcome       Problem: Skin Injury Risk (Adult)  Goal: Identify Related Risk Factors and Signs and Symptoms  Outcome: Outcome(s) achieved Date Met: 03/13/18 03/13/18 1814   Skin Injury Risk (Adult)   Related Risk Factors (Skin Injury Risk) advanced age;body weight extremes;critical care admission;fluid intake inadequate;hospitalization prolonged;infection;mechanical forces;medical devices;medication;mobility impaired;nutritional deficiencies;tissue perfusion altered     Goal: Skin Health and  Integrity  Outcome: Ongoing (interventions implemented as appropriate)   03/13/18 1814   Skin Injury Risk (Adult)   Skin Health and Integrity making progress toward outcome       Problem: Sepsis/Septic Shock (Adult)  Goal: Signs and Symptoms of Listed Potential Problems Will be Absent, Minimized or Managed (Sepsis/Septic Shock)  Outcome: Ongoing (interventions implemented as appropriate)   03/13/18 1814   Goal/Outcome Evaluation   Problems Assessed (Sepsis) all   Problems Present (Sepsis) glycemic control impaired;hypoperfusion/hemodynamic instability;hypoxia/hypoxemia;infection progression;situational response       Problem: Renal Replacement, Continuous (Adult)  Goal: Signs and Symptoms of Listed Potential Problems Will be Absent, Minimized or Managed (Renal Replacement, Continuous)  Outcome: Ongoing (interventions implemented as appropriate)   03/13/18 1814   Goal/Outcome Evaluation   Problems Assessed (Continuous Renal Replacement Therapy) all   Problems Present (CRRT) hypotension;electrolyte imbalance;fluid imbalance;acid-base imbalance;infection       Problem: Ventilation, Mechanical Invasive (Adult)  Goal: Signs and Symptoms of Listed Potential Problems Will be Absent, Minimized or Managed (Ventilation, Mechanical Invasive)  Outcome: Ongoing (interventions implemented as appropriate)   03/13/18 1814   Goal/Outcome Evaluation   Problems Assessed (Mechanical Ventilation, Invasive) all   Problems Present (Mech Vent, Invasive) immobility;inability to wean;mechanical dysfunction;situational response       Problem: Liver Failure, Acute/Chronic (Adult)  Goal: Signs and Symptoms of Listed Potential Problems Will be Absent, Minimized or Managed (Liver Failure, Acute/Chronic)  Outcome: Ongoing (interventions implemented as appropriate)   03/13/18 1814   Goal/Outcome Evaluation   Problems Assessed (Liver Failure, Acute/Chronic) all   Problems Present (Liver Failure) impaired coagulation;fluid/electrolyte  imbalance;infection;pain;respiratory compromise;situational response;renal dysfunction       Problem: Tissue Perfusion, Ineffective Peripheral (Adult)  Goal: Identify Related Risk Factors and Signs and Symptoms  Outcome: Outcome(s) achieved Date Met: 03/13/18 03/13/18 1814   Tissue Perfusion, Ineffective Peripheral (Adult)   Related Risk Factors (Ineffective Peripheral Tissue Perfusion) medication side effects;venous flow reduced   Signs and Symptoms (Ineffective Peripheral Tissue Perfusion) capillary refill: greater than 3 seconds (adult);edema;peripheral pulses absent/decreased;skin color changes;skin temperature changes     Goal: Adequate Tissue Perfusion  Outcome: Ongoing (interventions implemented as appropriate)   03/13/18 1814   Tissue Perfusion, Ineffective Peripheral (Adult)   Adequate Tissue Perfusion making progress toward outcome

## 2018-03-13 NOTE — CONSULTS
"Adult Nutrition  Assessment/PES    Patient Name:  Jess Barber  YOB: 1945  MRN: [id number]  Admit Date:  3/12/2018    Assessment Date:  3/13/2018    Comments:  Nutrition assessment completed.  Will remain available as needed pending poor prognosis.          Adult Nutrition Assessment     Row Name 03/13/18 0910       Calculation Measurements    Weight Used For Calculations 94.9 kg (209 lb 3.5 oz)       Estimated/Assessed Needs    Additional Documentation Calorie Requirements (Group);Protein Requirements (Group)       Calorie Requirements    Estimated Calorie Requirement Comment [id number]   15-20       KCAL/KG    14 Kcal/Kg (kcal) 1328.6    15 Kcal/Kg (kcal) 1423.5    18 Kcal/Kg (kcal) 1708.2    20 Kcal/Kg (kcal) 1898    25 Kcal/Kg (kcal) 2372.5    30 Kcal/Kg (kcal) 2847    35 Kcal/Kg (kcal) 3321.5    40 Kcal/Kg (kcal) 3796    45 Kcal/Kg (kcal) 4270.5    50 Kcal/Kg (kcal) 4745       Conklin-St. Jeor Equation    RMR (Conklin-St. Jeor Equation) 1418       Protein Requirements    Est Protein Requirement Amount (gms/kg) --   100-130 PER 2.0-2.5 IBW       Fluid Requirements    Mckinley-Segar Method (over 20 kg) 3398       Nutrition Prescription PO    Current PO Diet NPO    Row Name 03/13/18 0908       Physical Findings    Overall Physical Appearance on ventilator support    Skin --   bruise    Row Name 03/13/18 0907       Labs/Procedures/Meds    Lab Results Reviewed reviewed    Lab Results Comments glu, na, k+, cr, alb, TBili, ALT    Row Name 03/13/18 0906       Anthropometrics    Height 160 cm (62.99\")    Weight 94.9 kg (209 lb 3.5 oz)       Ideal Body Weight (IBW)    Ideal Body Weight (IBW) (kg) 52.7    % Ideal Body Weight 180.08       Body Mass Index (BMI)    BMI (kg/m2) 37.15    BMI Assessment BMI 35-39.9: obesity grade II       IBW Adjustment, Para/Tetraplegia    5% Adjustment, Para (IBW) 50.06    10% Adjustment, Para (IBW) 47.43    10% Adjustment, Tetra (IBW) 47.43    15% Adjustment, Tetra (IBW) " "44.8    Row Name 03/13/18 0904       Reason for Assessment    Reason For Assessment other (see comments)   rounds    Diagnosis pulmonary disease;other (see comments)   MSOF, PNU/ARDS ON VENT, PARESH ON SLED, HEPATITIS, STROKE, DM    Row Name 03/13/18 0240       Anthropometrics    Height 160 cm (63\")    Weight 94.9 kg (209 lb 3.5 oz)       Ideal Body Weight (IBW)    Ideal Body Weight (IBW) (kg) 52.72    % Ideal Body Weight 180.01       Body Mass Index (BMI)    BMI (kg/m2) 37.14       IBW Adjustment, Para/Tetraplegia    5% Adjustment, Para (IBW) 50.08    10% Adjustment, Para (IBW) 47.45    10% Adjustment, Tetra (IBW) 47.45    15% Adjustment, Tetra (IBW) 44.81          Problem/Interventions:        Problem 1     Row Name 03/13/18 0932       Nutrition Diagnoses Problem 1    Problem 1 Nutrition Appropriate for Condition at this Time    Pulmonary/Critical Care Acute respiratory failure;Ventilator   MSOF                    Intervention Goal     Row Name 03/13/18 0935       Intervention Goal    General Maintain nutrition    Weight No significant weight loss            Nutrition Intervention     Row Name 03/13/18 0935       Nutrition Intervention    RD/Tech Action Follow Tx progress;Care plan reviewd              Education/Evaluation     Row Name 03/13/18 0935       Education    Education Education not appropriate at this time    Please explain Patient intubated       Monitor/Evaluation    Monitor Per protocol        Electronically signed by:  Shabana Parish RD  03/13/18 9:36 AM  "

## 2018-03-13 NOTE — NURSING NOTE
Patient is RULED OUT for organ donation, per [facility]. If patient does pass away, please call back with cardiac time of death.

## 2018-03-13 NOTE — CONSULTS
[facility]  Initial Inpatient Consult Note    Referring Provider: Dr Ibarra    Reason for Consultation: Hepatitis    Subjective     History of present illness:  Pt currently intubated, non-responsive.  History obtained from the chart, nurse and family.      73yo W with PMH of stroke headache, pain issues who is now nonresponsive by her  this morning.  He called 911; she was found to be hypoglycemic and responded to D50.  She was brought to the emergency room.  She was nonresponsive.  She was hypotensive.  She was intubated.  She is now on pressors.  She was found to have acute hepatitis with transaminases above 5000.  She is coagulopathic with an INR greater than 6.  She is in PARESH.  She was in rhabdomyolysis.  She has been getting FFP.  She is on maximal dose of Levophed.    Per discussion with the family, she had dental implant post place about 4 weeks ago.  She had been having some increased pain from this.  Apparently, she has seen a pain management doctor and has been prescribed Percocet for chronic pain issues.  Per reports, it seems like she may have gotten a supply of 90 percocet pills plus some medication for the dental implants here in the past few weeks.  She is felt poorly for the past week.  She is the primary caregiver for her .  However, she has been in bed and lethargic most of the week.  She did have a visit by her primary care physician about 2 days ago.  She was checked for the flu.  Labs were drawn including a urinalysis (we do not have those results).  She was given a steroid shot.      Family members who saw her in the past few days said that she was lethargic and seemed to be drugged.  She was last awake and alert on Sunday.  This morning she was found non-responsive by her .    Per reports, the family is not able to find the pain medication bottles but no one is able to corroborate whether or not she actually had the pain medication or not-- the   had no idea that she was on any pain medication.    Her  reports that she would not want aggressive care.  She is currently a DNR.    Past Medical History:  Past Medical History:   Diagnosis Date   • Abnormal finding in urine    • Acute bronchitis    • Asthma    • Atypical chest pain    • Breast cancer screening    • Bunion    • Cough    • Diabetes mellitus    • Diverticulosis    • Dyslipidemia 03/04/2015   • Eczema 05/14/2014    xerotic   • Edema 08/09/2015   • External hemorrhoids    • Fatigue    • Foramen ovale    • Headache     cerebellar   • High risk medication use 03/04/2015    hydrocodone   • Hyperglycemia    • Hyperlipidemia    • Influenza    • Internal hemorrhoids    • Knee osteoarthritis    • Left knee pain    • Nausea    • Obesity    • Osteoporosis    • PAF (paroxysmal atrial fibrillation)    • PFO (patent foramen ovale) 11/17/2014   • Post-menopausal    • Post-operative complication 07/29/2015   • Pre-op testing    • Preoperative examination    • Psoriasis    • Rhinosinusitis    • Sacroiliitis    • Sebaceous cyst    • Sleep apnea    • Stroke    • UTI (urinary tract infection) 07/29/2015   • Vertigo    • Vitamin B12 deficiency    • Vitamin D deficiency    • Wheezing        Past Surgical History:  Past Surgical History:   Procedure Laterality Date   • BREAST SURGERY      biopsy   • CARDIAC CATHETERIZATION      1995 Dr. Alcazar- repeat was in 2010   • CHOLECYSTECTOMY     • COLON SURGERY      resection x2   • HYSTERECTOMY     • INCISIONAL BREAST BIOPSY     • LAPAROSCOPIC GASTRIC BANDING     • PATENT FORAMEN OVALE CLOSURE     • TOTAL KNEE ARTHROPLASTY      Right knee   • WRIST SURGERY      carpal tunnel        Social History:   Social History   Substance Use Topics   • Smoking status: Former Smoker     Types: Cigarettes   • Smokeless tobacco: Never Used      Comment: socially in college   • Alcohol use Yes      Comment: social        Family History:  Family History   Problem Relation Age of  Onset   • Family history unknown: Yes       Home Meds:  Prescriptions Prior to Admission   Medication Sig Dispense Refill Last Dose   • diltiazem (TIAZAC) 120 MG 24 hr capsule Take 1 capsule by mouth daily.   Taking   • fluocinonide (LIDEX) 0.05 % cream Apply topically 3 (three) times a day. Apply sparingly to affected areas   Taking   • furosemide (LASIX) 40 MG tablet Take 1 tablet by mouth Daily.   Taking   • gabapentin (NEURONTIN) 300 MG capsule Take 600 mg by mouth.   Taking   • gabapentin (NEURONTIN) 800 MG tablet Take 800 mg by mouth 3 (Three) Times a Day.   Taking   • metoprolol succinate XL (TOPROL-XL) 25 MG 24 hr tablet Take 25 mg by mouth Daily.   Taking   • oxyCODONE-acetaminophen (PERCOCET) 7.5-325 MG per tablet Take 1-2 tablets by mouth As Needed.   Taking   • promethazine (PHENERGAN) 25 MG tablet Take 25 mg by mouth As Needed.   Taking       Current Meds:     acetylcysteine 50 mg/kg Intravenous Once   acetylcysteine 100 mg/kg Intravenous Once   aztreonam 1 g Intravenous Q8H   famotidine 20 mg Intravenous Daily   sodium chloride 10 mL Intracatheter Q12H   Vancomycin Pharmacy Intermittent Dosing  Does not apply Daily       Allergies:  Allergies   Allergen Reactions   • Erythromycin Hives   • Ketorolac Itching   • Morphine And Related Itching and Swelling   • Sulfa Antibiotics    • Penicillins Rash       Review of Systems  Review of systems could not be obtained due to   patient intubated.     Objective     Vital Signs  Temp:  [95.9 °F (35.5 °C)-98.4 °F (36.9 °C)] 98.4 °F (36.9 °C)  Heart Rate:  [] 110  Resp:  [26-39] 39  BP: ()/(0-144) 194/144  FiO2 (%):  [100 %] 100 %    Physical Exam:  Constitutional:    Intubated, non-responsive, appears stated age   Eyes:            Lids and lashes normal, conjunctivae and sclerae normal, no   icterus   Ears, nose, mouth and throat:   Normal appearance of external ears and nose, no oral lesions, no thrush, oral mucosa moist   Respiratory:     Clear to  auscultation, tachypneic, on vent    Cardiovascular:    Regular rhythm and tachy rate, normal S1 and S2, no            murmur, no gallop, palpable distal pulses, no lower extremity edema   Gastrointestinal:    Soft and obese, non-distended, non-tender to palpation, no guarding, no rebound tenderness, normal bowel sounds, no palpable masses or organomegaly  Rectal exam: deferred   Musculoskeletal:   Normal station, no atrophy, no tenderness to palpation, normal digits and nails   Skin:   Pale color, no bleeding, bruising, rashes or lesions   Lymphatics:   No palpable cervical or supraclavicular adenopathy   Psychiatric:  Judgement and insight: not responsive   Orientation to person, place and time: intubated   Mood and affect: intubated       Results Review:   I reviewed the patient's new clinical results.      Results from last 7 days  Lab Units 03/12/18  1117   WBC 10*3/mm3 26.97*   HEMOGLOBIN g/dL 12.3   HEMATOCRIT % 37.6   PLATELETS 10*3/mm3 451         Results from last 7 days  Lab Units 03/12/18  1658 03/12/18  1117   SODIUM mmol/L 149* 145   POTASSIUM mmol/L 3.6 3.2*   CHLORIDE mmol/L 107 97*   CO2 mmol/L 10.2* 9.5*   BUN mg/dL 21 23   CREATININE mg/dL 4.05* 4.26*   CALCIUM mg/dL 6.0* 7.3*   BILIRUBIN mg/dL 3.5* 4.5*   ALK PHOS U/L 210* 278*   ALT (SGPT) U/L 3,690* 5,214*   AST (SGOT) U/L 5,612* >7,000*   GLUCOSE mg/dL 143* 266*         Results from last 7 days  Lab Units 03/12/18  1200   INR  6.76*         Lab Results  Lab Value Date/Time   LIPASE 36 06/27/2014 1923       Radiology:  Imaging Results (last 72 hours)     Procedure Component Value Units Date/Time    CT Chest Without Contrast [[id number]] Collected:  03/12/18 1858     Updated:  03/12/18 1911    Narrative:       CT CHEST, ABDOMEN AND PELVIS WITHOUT CONTRAST     HISTORY: 72-year-old female with pneumonia. Abdominal pain.  Gastroenteritis. Colitis is suspected. Sepsis.      TECHNIQUE: CT chest, abdomen and pelvis without IV or oral contrast.      COMPARISON: CT abdomen and pelvis with and without contrast 06/27/2014.  Correlation with chest x-ray 03/12/2018.     FINDINGS:  Chest: Endotracheal tube is present. Mildly enlarged mediastinal lymph  nodes are present. Right paratracheal lymph node just below the level of  the lucio measures 14 mm. Subcarinal lymph node measures 13 mm AP  dimension. These are most likely reactive nodes. Bilateral hazy  groundglass opacities are present throughout the upper and lower lobes.  There is more focal patchy airspace disease within the posterior upper  lobes and both lower lobes. Trace of pleural fluid is present. There is  no loculated effusion or evidence for abscess. Foramen ovale closure  device is evident.     Abdomen/pelvis: There is a gastric band. Moderate gastric distention is  present with air and fluid. A great deal of beam hardening artifact  overlies the upper abdomen as the patient was scanned with her arms to  her side. This contributes to beam hardening artifact. The liver,  spleen, adrenal glands, pancreas appear grossly normal. Kidneys appear  within normal limits and there is no hydronephrosis. There is mild  colonic distention with air and stool. No abnormal bowel wall thickening  is present. There is no ascites. Sigmoid diverticulosis is present  without evidence for diverticulitis. A right femoral venous catheter is  present with tip extending into the right external iliac vein. A Hayes  catheter is present within the decompressed urinary bladder. There has  been hysterectomy.       Impression:       1. Extensive bilateral groundglass opacities with superimposed patchy  airspace disease both upper and lower lobes. This may represent  interstitial and patchy alveolar pulmonary edema though ARDS or  infiltrate superimposed on interstitial edema or infiltrates are also  considerations. Trace pleural fluid is present.  2. Cardiomegaly. Foramen ovale closure device is present.  3. Gastric band is  present. Cholecystectomy clips.  4. Moderate gastric distention with air and fluid. Mild colonic  distention without evidence for bowel obstruction or ascites or abscess  formation.   5. Right common femoral venous catheter tip in the right external iliac  vein. Hayes catheter is in a decompressed urinary bladder.      Radiation dose reduction techniques were utilized, including automated  exposure control and exposure modulation based on body size.     This report was finalized on 3/12/2018 7:08 PM by Dr. Roberto Rico MD.       CT Abdomen Pelvis Without Contrast [[id number]] Collected:  03/12/18 1858     Updated:  03/12/18 1911    Narrative:       CT CHEST, ABDOMEN AND PELVIS WITHOUT CONTRAST     HISTORY: 72-year-old female with pneumonia. Abdominal pain.  Gastroenteritis. Colitis is suspected. Sepsis.      TECHNIQUE: CT chest, abdomen and pelvis without IV or oral contrast.     COMPARISON: CT abdomen and pelvis with and without contrast 06/27/2014.  Correlation with chest x-ray 03/12/2018.     FINDINGS:  Chest: Endotracheal tube is present. Mildly enlarged mediastinal lymph  nodes are present. Right paratracheal lymph node just below the level of  the lucio measures 14 mm. Subcarinal lymph node measures 13 mm AP  dimension. These are most likely reactive nodes. Bilateral hazy  groundglass opacities are present throughout the upper and lower lobes.  There is more focal patchy airspace disease within the posterior upper  lobes and both lower lobes. Trace of pleural fluid is present. There is  no loculated effusion or evidence for abscess. Foramen ovale closure  device is evident.     Abdomen/pelvis: There is a gastric band. Moderate gastric distention is  present with air and fluid. A great deal of beam hardening artifact  overlies the upper abdomen as the patient was scanned with her arms to  her side. This contributes to beam hardening artifact. The liver,  spleen, adrenal glands, pancreas appear grossly  normal. Kidneys appear  within normal limits and there is no hydronephrosis. There is mild  colonic distention with air and stool. No abnormal bowel wall thickening  is present. There is no ascites. Sigmoid diverticulosis is present  without evidence for diverticulitis. A right femoral venous catheter is  present with tip extending into the right external iliac vein. A Hayes  catheter is present within the decompressed urinary bladder. There has  been hysterectomy.       Impression:       1. Extensive bilateral groundglass opacities with superimposed patchy  airspace disease both upper and lower lobes. This may represent  interstitial and patchy alveolar pulmonary edema though ARDS or  infiltrate superimposed on interstitial edema or infiltrates are also  considerations. Trace pleural fluid is present.  2. Cardiomegaly. Foramen ovale closure device is present.  3. Gastric band is present. Cholecystectomy clips.  4. Moderate gastric distention with air and fluid. Mild colonic  distention without evidence for bowel obstruction or ascites or abscess  formation.   5. Right common femoral venous catheter tip in the right external iliac  vein. Hayes catheter is in a decompressed urinary bladder.      Radiation dose reduction techniques were utilized, including automated  exposure control and exposure modulation based on body size.     This report was finalized on 3/12/2018 7:08 PM by Dr. Roberto Rico MD.       XR Chest Post CVA Port [[id number]] Collected:  03/12/18 1655     Updated:  03/12/18 1703    Narrative:       XR CHEST POST CVA PORT-     INDICATIONS:    Endotracheal intubation, femoral line placement           TECHNIQUE: FRONTAL VIEW OF THE CHEST     COMPARISON: 3/12/2018 at 1149 hours     FINDINGS:      Endotracheal tube tip extends into the proximal right mainstem  bronchus, advise partially retracting the tube. The heart size is  borderline. Alveolar and interstitial infiltrates in both lungs may  reflect  edema and/or infection and represent interval worsening. No  pleural effusion or pneumothorax is seen, limited by supine positioning.  Right hemidiaphragm remains elevated. Otherwise stable.             Impression:          Endotracheal tube tip extends into the proximal right mainstem bronchus,  advise partially retracting the tube. Bilateral alveolar interstitial  infiltrates represent interval worsening.     Discussed by telephone with patient's nurse, Catherine, at time of  interpretation, 1700, 3/12/2018.        This report was finalized on 3/12/2018 5:00 PM by Dr. Clayton Chavarria MD.       CT Head Without Contrast [[id number]] Collected:  03/12/18 1557     Updated:  03/12/18 1557    Narrative:       EMERGENCY NONCONTRAST HEAD CT 03/12/2018     CLINICAL HISTORY: Confusion, septic.     TECHNIQUE: Spiral CT images were obtained from the base of the skull to  the vertex without intravenous contrast and due to motion degradation on  many of the images, repeat spiral CT images were obtained from the base  of the skull to the vertex without contrast and all images were  reformatted and submitted in 3 mm thick axial CT section with brain  algorithm.      There are no prior studies from [facility] for  comparison.      FINDINGS: Exam is slightly compromised by patient motion artifact even  despite repeating the CT through the head. There is some patchy  low-density in the frontal periventricular white matter consistent with  mild small vessel disease.  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 The  ventricles are normal in size.  There is no midline shift.  No  extra-axial fluid collections are identified and there is no evidence of  acute intracranial hemorrhage. The paranasal sinuses, the mastoid  air  cells and the middle ear cavities are clear.       Impression:          1. Exam is slightly compromised by patient motion artifact even despite  repeating the CT through the head.  2. There is mild small vessel disease in the frontal periventricular  white matter.   3. Moderate size area of encephalomalacia, most consistent with an old  infarct throughout the inferior left cerebellum measuring 4.8 x 3.7 cm  in size in the distribution of the inferior cerebellar branches of the  left PICA territory. The remainder of the head CT is within normal  limits. The results were communicated to Dr. Johnson in the emergency  room by telephone 03/12/2018 at 3 PM.      Radiation dose reduction techniques were utilized, including automated  exposure control and exposure modulation based on body size.          XR Chest 1 View [[id number]] Collected:  03/12/18 1201     Updated:  03/12/18 1208    Narrative:       XR CHEST 1 VW-     HISTORY: Female who is 72 years-old,  short of breath     TECHNIQUE: Frontal view of the chest     COMPARISON: 7/23/2015     FINDINGS: Patient is rotated towards the right. Heart size is  borderline. Mild interstitial prominence. No focal pulmonary  consolidation, pleural effusion or pneumothorax. Right hemidiaphragm is  mildly elevated. No acute osseous process.       Impression:       No focal pulmonary consolidation. Borderline heart size,  with mild prominence of interstitial markings. Follow-up as indications  persist.     This report was finalized on 3/12/2018 12:05 PM by Dr. Clayton Chavarria MD.             Assessment/Plan     Active Problems:    Hypoglycemia      Impression  1. Acute liver failure: acute, multiple possibilities including a high likelihood of acetaminophen toxicity, sepsis, rhabdo, shock liver, less likely a fulminant hepatitis A, B, C.  I am suspicious that the etiology is acetaminophen toxicity from a number of days ago, hence the normal Tylenol levels and progressive      2. Coagulopathy: due to above, she is getting FFP    3. PARESH: acidotic, plans for SLED    4. Sepsis: hypotensive, requiring pressors    5. Respiratory failure: on vent    6. Chronic pain issues: on percocet, 90 pills given 3/3/18.  Difficult to corroborate history as no one witnessed her taking the medication nor can the pill bottles be found but the scenario is very concerning for acetaminophen toxicity    Plan  -would continue to run acetylcystine 150mg/kg daily once the tylenol overdose regimen is complete  -check phosphorous levels  -continue supportive care measures  -follow INR  -daily CMP  -check acute hepatitis panel  -we discussed that if this is acetaminophen toxicity with liver failure, then her only option is transfer to [facility] for transplant evaluation.   is not certain that this is what she would want and is reluctant to proceed.  Agrees to see how she does over the next 12 hours  -we discussed her poor prognosis;  and family verbalized understanding    I discussed the patients findings and my recommendations with family, nursing staff, consulting provider and Dr Roosevelt Cade MD  [facility]      Dictated utilizing Dragon dictation

## 2018-03-13 NOTE — CONSULTS
"Referring Provider: Luis Armando Castro MD  [address]  [address] [address]    Reason for Consultation: sepsis    History of present illness:  Mrs Barber is a 71 YO who I am asked to evaluate and give opinion for sepsis. History is obtained from the RN Ave and review of the chart medical records (as patient is intubated and can't give history) which I summarize/synthesize as follows: For the past few weeks she is reported to have been having some muscle pains and weakness. She was tested for flu and was Ag negative but it sounds like she was empirically treated with Tamiflu. She was found unresponsive in her bed by her  yesterday. When EMS arrived her Glc was < 50 with only mild response to D50. She was brought in and found to have sepsis with leukocytosis, tachcyardia, inc RR. She also had hypotension, respiratory failure, liver failure, kidney failure. She was intubated and started on pressors and CRRT. She was given vancomycin and aztreonam for possible infection. 1/2 BCx positive for coag-neg Staph. She is on \"max dose Levophed\" without any significant improvement.    PMH:  Dm2  HLD  Patent foramen ovale  OA  Stroke    Past Surgical History:   Procedure Laterality Date   • BREAST SURGERY      biopsy   • CARDIAC CATHETERIZATION      1995 Dr. Alcazar- repeat was in 2010   • CHOLECYSTECTOMY     • COLON SURGERY      resection x2   • HYSTERECTOMY     • INCISIONAL BREAST BIOPSY     • LAPAROSCOPIC GASTRIC BANDING     • PATENT FORAMEN OVALE CLOSURE     • TOTAL KNEE ARTHROPLASTY      Right knee   • WRIST SURGERY      carpal tunnel       Social History:  Unable to obtain due to mental status    Family History:  Unable to obtain due to mental status    Allergies:    1. Erythromycin - hives  2. PCN - rash  3. Sulfa - unknown    Medications:    Current Facility-Administered Medications:   •  acetylcysteine (ACETADOTE) 12,240 mg in dextrose (D5W) 5 % 200 mL infusion, 150 mg/kg, Intravenous, " Daily, Aydee Cade MD  •  acetylcysteine (ACETADOTE) 8,160 mg in dextrose (D5W) 5 % 1,000 mL infusion, 100 mg/kg, Intravenous, Once, Ismael Ibarra MD, 8,160 mg at 03/12/18 2247  •  aztreonam (AZACTAM) 1 g/100 mL 0.9% NS IVPB (mbp), 1 g, Intravenous, Q8H, Ismael Ibarra MD, 1 g at 03/13/18 0330  •  calcium gluconate 1 g in sodium chloride 0.9 % 50 mL IVPB, 1 g, Intravenous, PRN **OR** calcium gluconate 2 g in sodium chloride 0.9 % 50 mL IVPB, 2 g, Intravenous, PRN, Dahlia Albert MD  •  calcium gluconate 2 g in sodium chloride 0.9 % 100 mL IVPB, 2 g, Intravenous, Q4H, Javed Cifuentes MD, 2 g at 03/13/18 1019  •  dextrose (D50W) solution 25 g, 25 g, Intravenous, Q15 Min PRN, Sudhakar Rojas MD  •  dextrose (GLUTOSE) oral gel 15 g, 15 g, Oral, Q15 Min PRN, Sudhakar Rojas MD  •  famotidine (PEPCID) injection 20 mg, 20 mg, Intravenous, Daily, Ismael Ibarra MD, 20 mg at 03/13/18 0828  •  glucagon (human recombinant) (GLUCAGEN DIAGNOSTIC) injection 1 mg, 1 mg, Subcutaneous, PRN, Sudhakar Rojas MD  •  heparin (porcine) injection 1,000-2,000 Units, 1,000-2,000 Units, Intravenous, PRN, Dahlia Albert MD  •  insulin aspart (novoLOG) injection 0-7 Units, 0-7 Units, Subcutaneous, Q4H, Sudhakar Rojas MD, 4 Units at 03/13/18 0330  •  LORazepam (ATIVAN) injection 2 mg, 2 mg, Intravenous, Q4H PRN, Ismael Ibarra MD, 2 mg at 03/12/18 1658  •  magnesium sulfate in D5W 1g/100mL (PREMIX), 2 g, Intravenous, PRN, Dahlia Albert MD  •  norepinephrine (LEVOPHED) infusion 16 mcg/mL (4 mg/250 mL) infusion, 0.02-0.3 mcg/kg/min, Intravenous, Titrated, Ismael Ibarra MD, Last Rate: 67.3 mL/hr at 03/13/18 1023, 0.22 mcg/kg/min at 03/13/18 1023  •  Pharmacy to dose vancomycin, , Does not apply, Continuous PRN, Ismael Ibarra MD  •  phenylephrine (CAROL-SYNEPHRINE) 50 mg/250 mL (0.2 mg/mL) in 0.9% NS  infusion, 0.5-3 mcg/kg/min, Intravenous, Titrated, Sandor Whiting MD, Stopped at 03/12/18 6944  •  potassium chloride 20 mEq in 50 mL IVPB, 20 mEq,  Intravenous, PRN, Dahlia Albert MD, Last Rate: 50 mL/hr at 03/13/18 1019, 20 mEq at 03/13/18 1019  •  propofol (DIPRIVAN) infusion 10 mg/mL 100 mL, 5-50 mcg/kg/min, Intravenous, Titrated, Ismael Ibarra MD, Last Rate: 11.39 mL/hr at 03/13/18 0858, 20 mcg/kg/min at 03/13/18 0858  •  remifentanil (ULTIVA) 50 mcg/mL in sodium chloride 0.9 % 100 mL, 0.5-15 mcg/kg/hr (Ideal), Intravenous, Titrated, Ismael Ibarra MD  •  sodium chloride 0.45 % infusion, 75 mL/hr, Intravenous, Continuous, Sudhakar Rojas MD, Last Rate: 75 mL/hr at 03/13/18 0331, 75 mL/hr at 03/13/18 0331  •  sodium chloride 0.9 % flush 10 mL, 10 mL, Intracatheter, Q12H, Ismael Ibarra MD, 10 mL at 03/13/18 0826  •  sodium chloride 0.9 % flush 10 mL, 10 mL, Intracatheter, PRN, Ismael Ibarra MD  •  sodium chloride 0.9 % flush 10 mL, 10 mL, Intracatheter, PRNIsmael MD  •  sodium chloride 0.9 % flush 10 mL, 10 mL, Intracatheter, PRNIsmael MD  •  sodium chloride 0.9 % flush 10 mL, 10 mL, Intracatheter, PRNIsmael MD  •  sodium chloride 0.9 % flush 10 mL, 10 mL, Intracatheter, PRNIsmael MD  •  sodium chloride 0.9 % infusion 100 mL, 100 mL, Intravenous, PRNDahlia MD  •  sodium chloride 0.9 % infusion, 9 mL/hr, Intravenous, Continuous, Sudhakar Rojas MD, Last Rate: 9 mL/hr at 03/13/18 0521, 9 mL/hr at 03/13/18 0521  •  sodium phosphates 10 mmol in sodium chloride 0.9 % 100 mL IVPB, 10 mmol, Intravenous, PRN, Dahlia Albert MD  •  Vancomycin Pharmacy Intermittent Dosing, , Does not apply, Daily, Luis Armando Castro MD  •  vasopressin (PITRESSIN) 20 Units in sodium chloride 0.9 % 100 mL (0.2 Units/mL) infusion, 0.03 Units/min, Intravenous, Continuous PRN, Sandor Whiting MD      Review of Systems  Unable to obtain due to mental status    Objective   Vital Signs   Temp:  [96 °F (35.6 °C)-98.8 °F (37.1 °C)] 98.1 °F (36.7 °C)  Heart Rate:  [] 112  Resp:  [26-46] 46  BP: ()/(0-144) 100/35  Arterial Line BP:  (103-158)/(43-78) 124/52  FiO2 (%):  [70 %-100 %] 70 %    Physical Exam:   General: intubated, sedated on propofol  Head: Normocephalic, atraumatic  Eyes: PERRL, no scleral icterus, + conjunctival pallor, no conjunctival hemorrhages.   ENT: ETT in place, no thrush seen.   Neck: Supple, IJ Hd catheter present  Cardiovascular: tachycardic, RR, no murmurs, rubs, or gallops; trace LE edema  Respiratory: Lungs with rales R > L, on 50%FiO2 on vent  GI: Abdomen is obese, soft, non-tender, non-distended, distant bowel sounds in all four quadrants; no hepatosplenomegaly, no masses palpated  :  Hayes catheter present  Musculoskeletal: TKA incision well-healed, no joint abnormalities, normal musculature  Skin: No rashes, lesions, or embolic phenomenon; easy bruising  Neurological: sedated, can't participate in strength or sensory exam  Psychiatric: sedated  Lymph: no pre-auricular, post-auricular, submandibular, cervical, supraclavicular  LAD  Vasc: extremities cool to touch    Labs:     Lab Results   Component Value Date    WBC 31.87 (C) 03/13/2018    HGB 10.9 (L) 03/13/2018    HCT 32.5 (L) 03/13/2018    MCV 91.0 03/13/2018     03/13/2018       Lab Results   Component Value Date    GLUCOSE 138 (H) 03/13/2018    BUN 14 03/13/2018    CREATININE 2.33 (H) 03/13/2018    EGFRIFNONA 20 (L) 03/13/2018    EGFRIFAFRI >60 07/23/2015    BCR 6.0 (L) 03/13/2018    CO2 23.9 03/13/2018    CALCIUM 6.8 (L) 03/13/2018    PROTENTOTREF 7.2 07/23/2015    ALBUMIN 2.70 (L) 03/13/2018    LABIL2 1.1 03/13/2018    AST 3,024 (H) 03/13/2018    ALT 2,870 (H) 03/13/2018     Lab Results   Component Value Date    VANCORANDOM 16.60 03/13/2018     Lab Results   Component Value Date    HGBA1C 6.0 (H) 04/14/2015       LA 9.2  Ck 6654  Procal 28  Acute hep panel   Tylenol < 5    Microbiology:  3/12 BCx: coag-neg Staph in 1/2 sets    Radiology (personally reviewed images/report):  CT chest with bilateral ground glass opacities, cardiomegaly, gastric  band  CT A/P negative for abscess or obstruction    Assessment/Plan   1. Septic shock with multisystem organ failure due to pneumonia with bilateral pulmonary infiltrates  -continue vancomycin with goal trough 15-20 on CRRT  -stop aztreonam  -start cefepime 2 g IV q24h dosed for CRRT (history of mild PCN allergy; cefepime is a much better antibiotic than aztreonam and benefit outweighs the risk)  -check sputum culture and RVP  -supportive ICU care    2. Acute kidney injury requiring CRRT    3. Hepatitis secondary to shock liver  -GI following    4. Positive blood culture  -coagulase negative Staph in 1/2 sets is a contaminant and does not require treatment  -will repeat a set of blood cultures    5. History of PCN allergy recorded in chart    6. History of DM2  -check A1c to quantify severity    I note that palliative care has been consulted. The patient's condition and prognosis are grave. It is my opinion she will not survive this episode and hospice/palliative care is appropriate, especially considering what are reported to be the patient's prior wishes (DNR/I).    Thank you for this consult. ID will follow. Antibiotic plan d/w RN.

## 2018-03-13 NOTE — PROGRESS NOTES
"Pharmacokinetic Consult - Vancomycin Dosing (Follow-up Note)    Jess Barber is on day 2 pharmacy to dose vancomycin for sepsis.  Pharmacy dosing vancomycin per Dr Ibarra's request.   Goal trough: 15-20 mg/L   Currently on CRRT    Relevant clinical data and objective history reviewed:  72 y.o. female 160 cm (62.99\") 94.9 kg (209 lb 3.5 oz)    Past Medical History:   Diagnosis Date   • Abnormal finding in urine    • Acute bronchitis    • Asthma    • Atypical chest pain    • Breast cancer screening    • Bunion    • Cough    • Diabetes mellitus    • Diverticulosis    • Dyslipidemia 03/04/2015   • Eczema 05/14/2014    xerotic   • Edema 08/09/2015   • External hemorrhoids    • Fatigue    • Foramen ovale    • Headache     cerebellar   • High risk medication use 03/04/2015    hydrocodone   • Hyperglycemia    • Hyperlipidemia    • Influenza    • Internal hemorrhoids    • Knee osteoarthritis    • Left knee pain    • Nausea    • Obesity    • Osteoporosis    • PAF (paroxysmal atrial fibrillation)    • PFO (patent foramen ovale) 11/17/2014   • Post-menopausal    • Post-operative complication 07/29/2015   • Pre-op testing    • Preoperative examination    • Psoriasis    • Rhinosinusitis    • Sacroiliitis    • Sebaceous cyst    • Sleep apnea    • Stroke    • UTI (urinary tract infection) 07/29/2015   • Vertigo    • Vitamin B12 deficiency    • Vitamin D deficiency    • Wheezing      Creatinine   Date Value Ref Range Status   03/13/2018 2.33 (H) 0.57 - 1.00 mg/dL Final   03/13/2018 2.51 (H) 0.57 - 1.00 mg/dL Final   03/13/2018 3.87 (H) 0.57 - 1.00 mg/dL Final     BUN   Date Value Ref Range Status   03/13/2018 14 8 - 23 mg/dL Final     Currently on CRRT     Lab Results   Component Value Date    WBC 31.87 (C) 03/13/2018     Temp Readings from Last 3 Encounters:   03/13/18 97.7 °F (36.5 °C)   07/23/15 98.3 °F (36.8 °C) (Oral)   06/01/15 98.4 °F (36.9 °C) (Oral)        Microbiology:  3/12 BCx: coag-neg Staph in 1/2 sets   "   Radiology (personally reviewed images/report):  CT chest with bilateral ground glass opacities, cardiomegaly, gastric band  CT A/P negative for abscess or obstruction    Anti-Infectives     Ordered     Dose/Rate Route Frequency Start Stop    03/13/18 1247  vancomycin (VANCOCIN) in iso-osmotic dextrose IVPB 1 g (premix) 200 mL     Ordering Provider:  Ismale Ibarra MD    1,000 mg  over 100 Minutes Intravenous Once 03/13/18 1330      03/13/18 1107  cefepime (MAXIPIME) 2 g/100 mL 0.9% NS (mbp)     Ordering Provider:  Kunal Conklin MD    2 g  200 mL/hr over 30 Minutes Intravenous Every 24 Hours 03/13/18 1230 03/20/18 1229    03/12/18 1653  Vancomycin Pharmacy Intermittent Dosing     Ordering Provider:  Luis Armando Castro MD     Does not apply Daily 03/12/18 1730 03/19/18 0859    03/12/18 1637  Pharmacy to dose vancomycin     Ordering Provider:  Ismael Ibarra MD     Does not apply Continuous PRN 03/12/18 1637 03/19/18 1636    03/12/18 1213  aztreonam (AZACTAM) 2 g/100 mL 0.9% NS (mbp)     Ordering Provider:  Gentry Johnson MD    2 g  over 30 Minutes Intravenous Once 03/12/18 1215 03/12/18 1341    03/12/18 1213  vancomycin 1750 mg/500 mL 0.9% NS IVPB ([facility])     Ordering Provider:  Gentry Johnson MD    20 mg/kg × 81.6 kg Intravenous Once 03/12/18 1215 03/12/18 1327           Lab Results   Component Value Date    VANCORANDOM 16.60 03/13/2018     Vancomycin Dosing History:  3/12 1327 vancomycin 1750mg IV x1  3/13 0607 random level= 16.6 mcg/ml    Assessment  1. Septic shock w/ multisystem organ failure due to pneumonia with bilateral pulmonary infiltrates  2. PARESH- on CRRT  3. Repeat set of blood cultures. Previous blood cultures show Coag neg Staph 1/2 sets     Plan  Vancomycin 1gm iv x1 today. Random level in am.    Thank you for the consult,  Marivel Keys, PharmD

## 2018-03-13 NOTE — PROGRESS NOTES
"      [facility]         Dr Guevara Sayied   LOS: 1 day   Patient Care Team:  Aura Turcios MD as PCP - General  Aura Turcios MD as PCP - Family Medicine  Pratik Hung MD as PCP - Claims Attributed    Chief Complaint: Multiorgan system failure in the setting of sepsis versus acute liver injury.  Severe metabolic acidosis with underlying renal failure.    Interval History: Events noted chart reviewed.  Currently on SLED.  Requiring 1 pressors currently Levophed.  She is tachypneic on the vent.  Multiple lines including right femoral, A-line and dialysis catheter right IJ.  Patient is  intubated.  She is not following commands for me.    REVIEW OF SYSTEMS:   Intubated and confused.  Not following commands.    Ventilator/Non-Invasive Ventilation Settings     Start     Ordered    03/12/18 1936  Ventilator - AC/PC; (24); 100; 5; 18  Continuous     Question Answer Comment   Vent Mode AC/PC    Breath rate  24   FiO2 100    PEEP 5    Inspiratory Pressure 18        03/12/18 1937            Vital Signs  Temp:  [95.9 °F (35.5 °C)-98.8 °F (37.1 °C)] 98.1 °F (36.7 °C)  Heart Rate:  [] 112  Resp:  [26-46] 46  BP: ()/(0-144) 100/35  Arterial Line BP: (103-158)/(43-78) 124/52  FiO2 (%):  [70 %-100 %] 70 %    Intake/Output Summary (Last 24 hours) at 03/13/18 0912  Last data filed at 03/13/18 0715   Gross per 24 hour   Intake           8929.4 ml   Output              584 ml   Net           8345.4 ml     Flowsheet Rows    Flowsheet Row First Filed Value   Admission Height 162.6 cm (64\") Documented at 03/12/2018 1049   Admission Weight 81.6 kg (180 lb) Documented at 03/12/2018 1049          Physical Exam:   General Appearance:    Intubated ET tube good position orally.  Tachypneic on the vent.  Not following commands for me.  Multiple drips as noted above.     Lungs:     Diminished breath sounds with rhonchi and crackles bilaterally.      Heart:    Tachycardic on the monitor.     Chest Wall:    No " abnormalities observed   Abdomen:    Soft no masses felt.  No significant tenderness rebound or guarding.     Extremities:    no edema, no cyanosis, no             redness  CNS not following commands.  Pupils sluggish but reactive to light.       Results Review:          Results from last 7 days  Lab Units 03/13/18  0607 03/13/18  0119 03/12/18  1658   SODIUM mmol/L 146* 146* 149*   POTASSIUM mmol/L 2.9* 2.8* 3.6   CHLORIDE mmol/L 97* 97* 107   CO2 mmol/L 21.8* 12.2* 10.2*   BUN mg/dL 15 24* 21   CREATININE mg/dL 2.51* 3.87* 4.05*   GLUCOSE mg/dL 152* 256* 143*   CALCIUM mg/dL 6.8* 6.1* 6.0*       Results from last 7 days  Lab Units 03/12/18  1117   CK TOTAL U/L 1,807*       Results from last 7 days  Lab Units 03/13/18  0607 03/12/18  1117   WBC 10*3/mm3 31.87* 26.97*   HEMOGLOBIN g/dL 10.9* 12.3   HEMATOCRIT % 32.5* 37.6   PLATELETS 10*3/mm3 227 451       Results from last 7 days  Lab Units 03/13/18  0607 03/12/18  2132 03/12/18  1200   INR  3.09* 3.28* 6.76*           Results from last 7 days  Lab Units 03/13/18  0607   MAGNESIUM mg/dL 2.2           Results from last 7 days  Lab Units 03/13/18  0842   PH, ARTERIAL pH units 7.586*   PO2 ART mm Hg 110.6*   PCO2, ARTERIAL mm Hg 20.2*   HCO3 ART mmol/L 19.1*       I reviewed the patient's new clinical results.  I personally viewed and interpreted the patient's CXR        Medication Review:     acetylcysteine 150 mg/kg Intravenous Daily   acetylcysteine 100 mg/kg Intravenous Once   aztreonam 1 g Intravenous Q8H   famotidine 20 mg Intravenous Daily   insulin aspart 0-7 Units Subcutaneous Q4H   sodium chloride 10 mL Intracatheter Q12H   Vancomycin Pharmacy Intermittent Dosing  Does not apply Daily         dextrose 5 % and sodium chloride 0.9 % 100 mL/hr Last Rate: Stopped (03/12/18 1341)   norepinephrine (LEVOPHED) infusion 16 mcg/mL (4 mg/250 mL) infusion 0.02-0.3 mcg/kg/min Last Rate: 0.24 mcg/kg/min (03/13/18 0637)   Pharmacy to dose vancomycin     phenylephrine 0.5-3  mcg/kg/min Last Rate: Stopped (03/12/18 2133)   propofol 5-50 mcg/kg/min Last Rate: 20 mcg/kg/min (03/13/18 0858)   sodium bicarbonate drip (greater than 75 mEq/bag) 200 mL/hr Last Rate: Stopped (03/13/18 0332)   sodium chloride 75 mL/hr Last Rate: 75 mL/hr (03/13/18 0331)   sodium chloride 9 mL/hr Last Rate: 9 mL/hr (03/13/18 0521)   vasopressin 0.03 Units/min        ASSESSMENT:   Shock likely septic and possibly distributive  Multiorgan system failure  Acute respiratory failure  Bilateral pneumonia/ARDS  Initial severe metabolic acidosis now respiratory and metabolic alkalosis  Acute liver injury/shock liver versus acute Tylenol toxicity  Coagulopathy  Acute renal failure  Rhabdomyolysis  Severe lactic acidosis  History of CVA  Multiple electrolyte abnormalities      PLAN:  Etiology of shock septic plus possibly distributive due to acute liver injury  Initial severe metabolic acidosis now alkalosis combined respiratory with alkalosis.  Vent has been adjusted.  I will sedated the patient more to slow down her respiratory rate to correct her alkalosis.  Currently on sled per renal  Continue antibiotics we'll check pro calcitonin  CT chest and abdomen pelvis was reviewed.  Evidence of pneumonia/ARDS noted.  Will repeat CK and continue IV fluids per renal  Coagulopathy improved with vitamin K and FFP.  No signs of overt bleeding.  Drop in hemoglobin noted.  We will continue to monitor  Complete Mucomyst protocol.  Continue supportive care.  Long discussion with the patient's family with Dr. Rafael holland.  We discussed her overall prognosis and goals of care.  Although her prognosis may not change but we may be able to figure out etiology of current inciting event  Her overall prognosis remains extremely poor due to multiorgan system failure    Ismael Ibarra MD  03/13/18  9:12 AM      Time: Critical care 75 min

## 2018-03-13 NOTE — PROCEDURES
Procedure: Placement of hemodialysis catheter    Informed consent obtained from next of kin after explaining risks potential benefits of the seizure.  Alternatives discussed.    Timeout completed.  Laterality confirmed.  Patient identified with 2 different markers.  Procedure reconfirmed.    Right IJ area cleaned and draped.  One percent instilled under the skin.  Under ultrasound guidance right IJ located.  Needle inserted and via placed using Seldinger technique.  Hemodialysis catheter threaded over the wire after dilation.  All ports withdrew blood pressure with saline.  End applied.  Sutures applied.  Dressing applied.  No complications of procedure.

## 2018-03-14 NOTE — PROGRESS NOTES
"   LOS: 2 days   Patient Care Team:  Aura Turcios MD as PCP - General  Aura Turcios MD as PCP - Family Medicine  Pratik Hung MD as PCP - Claims Attributed    Chief Complaint/ Reason for encounter: Acute renal failure, need for continuous renal replacement therapy, electrolyte management, extensive discussion with family regarding goals of care, continuing with dialysis and ventilation      Chief Complaint   Patient presents with   • Hypoglycemia         Subjective     Hypoglycemia   Associated symptoms include weakness. Pertinent negatives include no chest pain or fever.       Subjective:  Symptoms:  Worsening.  She reports malaise and weakness.  No shortness of breath or chest pain.  (Patient remains critically ill this morning, on pressors.  Minimal urine output, currently off dialysis but worsening acidosis  Minimally responsive).    Diet:  NPO.    Activity level: Impaired due to weakness.    Pain:  She reports no pain.          History taken from: Patient and chart    Objective     Vital Signs  Temp:  [97.6 °F (36.4 °C)-100.2 °F (37.9 °C)] 100.2 °F (37.9 °C)  Heart Rate:  [118-140] 122  Resp:  [36-46] 44  BP: ()/(27-79) 132/58  Arterial Line BP: ()/(39-67) 132/46  FiO2 (%):  [40 %-60 %] 60 %       Wt Readings from Last 1 Encounters:   03/14/18 0405 99.9 kg (220 lb 3.8 oz)   03/13/18 0906 94.9 kg (209 lb 3.5 oz)   03/13/18 0240 94.9 kg (209 lb 3.5 oz)   03/12/18 1049 81.6 kg (180 lb)       Objective:  General Appearance:  Comfortable, ill-appearing, in no acute distress and not in pain.    Vital signs: (most recent): Blood pressure 132/58, pulse (!) 122, temperature 100.2 °F (37.9 °C), temperature source Oral, resp. rate (!) 44, height 160 cm (62.99\"), weight 99.9 kg (220 lb 3.8 oz), SpO2 100 %.  No fever.  (Hypotensive on pressors, tachycardic).    Output: Minimal urine output (Dark urine).    HEENT: (Endotracheal tube in place)    Lungs:  Normal effort and tachypnea.  Breath sounds clear to " auscultation.  She is not in respiratory distress.  There are decreased breath sounds.  No rales or rhonchi.    Heart: Tachycardia.  Regular rhythm.  S1 normal.  No murmur.   Abdomen: Abdomen is soft and non-distended.  There are no signs of ascites.  Hypoactive bowel sounds.   There is no abdominal tenderness.     Extremities: Normal range of motion.  There is dependent edema.  There is no deformity.    Pulses: Distal pulses are intact.    Skin:  Warm and dry.  No rash or cyanosis.             Results Review:    Past Medical History: reviewed and updated  Past Medical History:   Diagnosis Date   • Abnormal finding in urine    • Acute bronchitis    • Asthma    • Atypical chest pain    • Breast cancer screening    • Bunion    • Cough    • Diabetes mellitus    • Diverticulosis    • Dyslipidemia 03/04/2015   • Eczema 05/14/2014    xerotic   • Edema 08/09/2015   • External hemorrhoids    • Fatigue    • Foramen ovale    • Headache     cerebellar   • High risk medication use 03/04/2015    hydrocodone   • Hyperglycemia    • Hyperlipidemia    • Influenza    • Internal hemorrhoids    • Knee osteoarthritis    • Left knee pain    • Nausea    • Obesity    • Osteoporosis    • PAF (paroxysmal atrial fibrillation)    • PFO (patent foramen ovale) 11/17/2014   • Post-menopausal    • Post-operative complication 07/29/2015   • Pre-op testing    • Preoperative examination    • Psoriasis    • Rhinosinusitis    • Sacroiliitis    • Sebaceous cyst    • Sleep apnea    • Stroke    • UTI (urinary tract infection) 07/29/2015   • Vertigo    • Vitamin B12 deficiency    • Vitamin D deficiency    • Wheezing          Allergies:  Allergies   Allergen Reactions   • Erythromycin Hives   • Ketorolac Itching   • Morphine And Related Itching and Swelling   • Sulfa Antibiotics    • Penicillins Rash       Intake/Output:     Intake/Output Summary (Last 24 hours) at 03/14/18 1151  Last data filed at 03/14/18 0528   Gross per 24 hour   Intake          4420.33  ml   Output             1058 ml   Net          3362.33 ml         DATA:  Radiology and Labs:  The following labs independently reviewed by me.  Interval notes, chart personally reviewed by me.   Old records independently reviewed showing Normal prior renal function prior to admission  The following radiologic studies independently viewed by me, findings chest x-ray with vascular congestion, ARDS type picture, Slightly worse  Worsening problems include renal failure, liver failure, respiratory failure, hypotensive shock on pressors  Discussed with family, RN  Extensive discussion with family regarding goals of care, CODE STATUS, continuation of dialysis, Mechanical ventilation, pressors  Family is very steadfast in the decision to withdraw all care at this time stating it would not be within the patient's      Risk/ complexity of medical care/ medical decision making extremely high given the complex comorbidities, need for renal replacement therapy, critical care, decision regarding CODE STATUS and level of care    Patient is critically ill and I am seeing her emergently due to the critical nature of the illness.      Medical decision making is highly complex due to the critical nature of the patient's illness, need for ICU care, coordination of care.  Management of her condition requires careful assessment, manipulation and support of vital organ system functions to treat acute renal failure   This consisted of a thorough review of pertinent laboratory tests, diagnostic tests, medical records and conversations with other physicians and medical staff directly involved in the patient care.  Care was discussed with family members as patient's critical nature makes did impossible to obtain adequate history from the patient.  Total critical care time spent was 30 minutes, from 9:30 AM to 10:00 AM                        Labs:   Recent Results (from the past 24 hour(s))   Adult Transthoracic Echo Complete W/ Cont if  Necessary Per Protocol    Collection Time: 03/13/18 12:15 PM   Result Value Ref Range    BSA 2.0 m^2    IVSd 1.1 cm    LVIDd 4.2 cm    LVIDs 2.8 cm    LVPWd 1.0 cm    IVS/LVPW 1.1     FS 33.3 %    EDV(Teich) 78.6 ml    ESV(Teich) 29.6 ml    EF(Teich) 62.4 %    EDV(cubed) 74.1 ml    ESV(cubed) 22.0 ml    EF(cubed) 70.4 %    LV mass(C)d 147.0 grams    LV mass(C)dI 74.6 grams/m^2    SV(Teich) 49.0 ml    SI(Teich) 24.9 ml/m^2    SV(cubed) 52.1 ml    SI(cubed) 26.5 ml/m^2    Ao root diam 2.2 cm    Ao root area 3.8 cm^2    ACS 1.6 cm    asc Aorta Diam 1.9 cm    LVOT diam 1.7 cm    LVOT area 2.3 cm^2    LVOT area(traced) 2.3 cm^2    RVOT diam 1.1 cm    RVOT area 0.95 cm^2    LVLd ap4 7.4 cm    EDV(MOD-sp4) 70.0 ml    LVLs ap4 6.1 cm    ESV(MOD-sp4) 23.0 ml    EF(MOD-sp4) 67.1 %    LVLd ap2 6.8 cm    EDV(MOD-sp2) 48.0 ml    LVLs ap2 5.4 cm    ESV(MOD-sp2) 16.0 ml    EF(MOD-sp2) 66.7 %    SV(MOD-sp4) 47.0 ml    SI(MOD-sp4) 23.9 ml/m^2    SV(MOD-sp2) 32.0 ml    SI(MOD-sp2) 16.2 ml/m^2    Ao root area (BSA corrected) 1.1     Ao root area (BSA corrected) 66.7 ml/m^2    CONTRAST EF 4CH 67.1 ml/m^2    LV Diastolic corrected for BSA 35.5 ml/m^2    LV Systolic corrected for BSA 11.7 ml/m^2    MV A dur 0.09 sec    MV E max smita 75.5 cm/sec    MV A max smita 107.0 cm/sec    MV E/A 0.71     MV V2 mean 71.8 cm/sec    MV mean PG 3.0 mmHg    MV V2 VTI 20.1 cm    MVA(VTI) 2.3 cm^2    MV P1/2t max smita 128.0 cm/sec    MV P1/2t 57.6 msec    MVA(P1/2t) 3.8 cm^2    MV dec slope 651.0 cm/sec^2    MV dec time 0.09 sec    Ao V2 mean 95.5 cm/sec    Ao mean PG 4.0 mmHg    Ao mean PG (full) 1.0 mmHg    Ao V2 VTI 22.1 cm    MAGDA(I,A) 2.1 cm^2    MAGDA(I,D) 2.1 cm^2    LV V1 mean PG 3.0 mmHg    LV V1 mean 81.2 cm/sec    LV V1 VTI 20.2 cm    SV(Ao) 84.0 ml    SI(Ao) 42.6 ml/m^2    SV(LVOT) 45.9 ml    SV(RVOT) 10.5 ml    SI(LVOT) 23.3 ml/m^2    PA V2 max 113.0 cm/sec    PA max PG 5.1 mmHg    PA acc slope 19.3 cm/sec^2    PA acc time 0.07 sec    RV V1 mean PG  2.0 mmHg    RV V1 mean 54.0 cm/sec    RV V1 VTI 11.0 cm    PA pr(Accel) 47.5 mmHg    Qp/Qs 0.23     Pulm A Revs Dur 0.12 sec    Pulm A Revs Jenaro 45.9 cm/sec    MVA P1/2T LCG 1.7 cm^2     CV ECHO HARDIK - BZI_BMI 37.0 kilograms/m^2     CV ECHO HARDIK - BSA(HAYCOCK) 2.1 m^2     CV ECHO HARDIK - BZI_METRIC_WEIGHT 94.8 kg     CV ECHO HARDIK - BZI_METRIC_HEIGHT 160.0 cm    Target HR (85%) 124 bpm    Max. Pred. HR (100%) 146 bpm    TDI S' 13.00 cm/sec    RV Base 4.20 cm    RV Length 6.60 cm    RV Mid 3.90 cm    Ascending aorta 1.90 cm    E/E' ratio 11.0     LA Volume Index 10.0 mL/m2    Lat Peak E' Jenaro 5.0 cm/sec    Med Peak E' Jenaro 4.00 cm/sec    Abdo Ao Diam 1.71 cm    TAPSE (>1.6) 2.50 cm2   Renal Function Panel    Collection Time: 03/13/18 12:31 PM   Result Value Ref Range    Glucose 82 65 - 99 mg/dL    BUN 11 8 - 23 mg/dL    Creatinine 1.80 (H) 0.57 - 1.00 mg/dL    Sodium 142 136 - 145 mmol/L    Potassium 3.4 (L) 3.5 - 5.2 mmol/L    Chloride 97 (L) 98 - 107 mmol/L    CO2 21.6 (L) 22.0 - 29.0 mmol/L    Calcium 7.4 (L) 8.6 - 10.5 mg/dL    Albumin 2.40 (L) 3.50 - 5.20 g/dL    Phosphorus 2.0 (L) 2.5 - 4.5 mg/dL    Anion Gap 23.4 mmol/L    BUN/Creatinine Ratio 6.1 (L) 7.0 - 25.0    eGFR Non African Amer 28 (L) >60 mL/min/1.73   Magnesium    Collection Time: 03/13/18 12:31 PM   Result Value Ref Range    Magnesium 1.7 1.6 - 2.4 mg/dL   Calcium, Ionized    Collection Time: 03/13/18 12:31 PM   Result Value Ref Range    Ionized Calcium 0.96 (L) 1.15 - 1.35 mmol/L    Ionized Calcium 3.8 (L) 4.6 - 5.4 mg/dL   POC Glucose Once    Collection Time: 03/13/18  3:25 PM   Result Value Ref Range    Glucose 110 70 - 130 mg/dL   Acetaminophen Level    Collection Time: 03/13/18  4:18 PM   Result Value Ref Range    Acetaminophen <5.0 (L) 10.0 - 30.0 mcg/mL   ALT    Collection Time: 03/13/18  4:18 PM   Result Value Ref Range    ALT (SGPT) 2,319 (H) 1 - 33 U/L   AST    Collection Time: 03/13/18  4:18 PM   Result Value Ref Range    AST (SGOT)  1,869 (H) 1 - 32 U/L   Blood Gas, Arterial    Collection Time: 03/13/18  4:56 PM   Result Value Ref Range    Site Arterial Line     Carlos's Test N/A     pH, Arterial 7.566 (C) 7.350 - 7.450 pH units    pCO2, Arterial 24.1 (L) 35.0 - 45.0 mm Hg    pO2, Arterial 77.2 (L) 80.0 - 100.0 mm Hg    HCO3, Arterial 21.8 (L) 22.0 - 28.0 mmol/L    Base Excess, Arterial 0.4 0.0 - 2.0 mmol/L    O2 Saturation Calculated 97.3 92.0 - 99.0 %    A-a Gradiant 0.3 mmHg    Barometric Pressure for Blood Gas 752.9 mmHg    Modality Adult Vent     FIO2 40 %    Ventilator Mode PC     Set Tidal Volume 492     Set Mech Resp Rate 18     Rate 42 Breaths/minute    PEEP 5    Prepare Fresh Frozen Plasma, 2 Units    Collection Time: 03/13/18  6:00 PM   Result Value Ref Range    Product Code N5616WL8     Unit Number [id number]     UNIT  ABO O     UNIT  RH POS     Dispense Status PT     Blood Type OPOS     Blood Expiration Date 201803171845     Blood Type Barcode 5100     Product Code R2046T76     Unit Number [id number]     UNIT  ABO O     UNIT  RH POS     Dispense Status PT     Blood Type OPOS     Blood Expiration Date 201803171845     Blood Type Barcode 5100    Renal Function Panel    Collection Time: 03/13/18  6:02 PM   Result Value Ref Range    Glucose 96 65 - 99 mg/dL    BUN 9 8 - 23 mg/dL    Creatinine 1.53 (H) 0.57 - 1.00 mg/dL    Sodium 144 136 - 145 mmol/L    Potassium 3.8 3.5 - 5.2 mmol/L    Chloride 99 98 - 107 mmol/L    CO2 20.4 (L) 22.0 - 29.0 mmol/L    Calcium 7.4 (L) 8.6 - 10.5 mg/dL    Albumin 2.40 (L) 3.50 - 5.20 g/dL    Phosphorus 2.1 (L) 2.5 - 4.5 mg/dL    Anion Gap 24.6 mmol/L    BUN/Creatinine Ratio 5.9 (L) 7.0 - 25.0    eGFR Non African Amer 33 (L) >60 mL/min/1.73   Magnesium    Collection Time: 03/13/18  6:02 PM   Result Value Ref Range    Magnesium 1.8 1.6 - 2.4 mg/dL   Calcium, Ionized    Collection Time: 03/13/18  6:02 PM   Result Value Ref Range    Ionized Calcium 1.00 (L) 1.15 - 1.35 mmol/L    Ionized Calcium 4.0  (L) 4.6 - 5.4 mg/dL   POC Glucose Once    Collection Time: 03/13/18  9:20 PM   Result Value Ref Range    Glucose 41 (C) 70 - 130 mg/dL   POC Glucose Once    Collection Time: 03/13/18 10:15 PM   Result Value Ref Range    Glucose 92 70 - 130 mg/dL   POC Glucose Once    Collection Time: 03/13/18 11:30 PM   Result Value Ref Range    Glucose 91 70 - 130 mg/dL   Renal Function Panel    Collection Time: 03/13/18 11:31 PM   Result Value Ref Range    Glucose 84 65 - 99 mg/dL    BUN 11 8 - 23 mg/dL    Creatinine 1.81 (H) 0.57 - 1.00 mg/dL    Sodium 142 136 - 145 mmol/L    Potassium 3.8 3.5 - 5.2 mmol/L    Chloride 98 98 - 107 mmol/L    CO2 19.0 (L) 22.0 - 29.0 mmol/L    Calcium 8.4 (L) 8.6 - 10.5 mg/dL    Albumin 2.10 (L) 3.50 - 5.20 g/dL    Phosphorus 3.0 2.5 - 4.5 mg/dL    Anion Gap 25.0 mmol/L    BUN/Creatinine Ratio 6.1 (L) 7.0 - 25.0    eGFR Non African Amer 27 (L) >60 mL/min/1.73   Magnesium    Collection Time: 03/13/18 11:31 PM   Result Value Ref Range    Magnesium 1.7 1.6 - 2.4 mg/dL   Calcium, Ionized    Collection Time: 03/13/18 11:31 PM   Result Value Ref Range    Ionized Calcium 1.14 (L) 1.15 - 1.35 mmol/L    Ionized Calcium 4.6 4.6 - 5.4 mg/dL   Blood Gas, Arterial    Collection Time: 03/14/18  3:04 AM   Result Value Ref Range    Site Arterial Line     Carlos's Test N/A     pH, Arterial 7.464 (H) 7.350 - 7.450 pH units    pCO2, Arterial 27.4 (L) 35.0 - 45.0 mm Hg    pO2, Arterial 92.1 80.0 - 100.0 mm Hg    HCO3, Arterial 19.6 (L) 22.0 - 28.0 mmol/L    Base Excess, Arterial -3.2 (L) 0.0 - 2.0 mmol/L    O2 Saturation Calculated 97.8 92.0 - 99.0 %    A-a Gradiant 0.2 mmHg    Barometric Pressure for Blood Gas 753.6 mmHg    Modality Adult Vent     FIO2 60 %    Ventilator Mode PC     Set Tidal Volume 499     Set Mech Resp Rate 18     Rate 39 Breaths/minute    PEEP 5    POC Glucose Once    Collection Time: 03/14/18  4:00 AM   Result Value Ref Range    Glucose 33 (C) 70 - 130 mg/dL   POC Glucose Once    Collection Time:  03/14/18  4:24 AM   Result Value Ref Range    Glucose 73 70 - 130 mg/dL   Magnesium    Collection Time: 03/14/18  5:49 AM   Result Value Ref Range    Magnesium 1.7 1.6 - 2.4 mg/dL   Calcium, Ionized    Collection Time: 03/14/18  5:49 AM   Result Value Ref Range    Ionized Calcium 1.07 (L) 1.15 - 1.35 mmol/L    Ionized Calcium 4.3 (L) 4.6 - 5.4 mg/dL   Comprehensive Metabolic Panel    Collection Time: 03/14/18  5:49 AM   Result Value Ref Range    Glucose 113 (H) 65 - 99 mg/dL    BUN 13 8 - 23 mg/dL    Creatinine 2.13 (H) 0.57 - 1.00 mg/dL    Sodium 141 136 - 145 mmol/L    Potassium 3.8 3.5 - 5.2 mmol/L    Chloride 98 98 - 107 mmol/L    CO2 18.6 (L) 22.0 - 29.0 mmol/L    Calcium 8.0 (L) 8.6 - 10.5 mg/dL    Total Protein 4.5 (L) 6.0 - 8.5 g/dL    Albumin 1.80 (L) 3.50 - 5.20 g/dL    ALT (SGPT) 1,682 (H) 1 - 33 U/L    AST (SGOT) 774 (H) 1 - 32 U/L    Alkaline Phosphatase 255 (H) 39 - 117 U/L    Total Bilirubin 8.1 (H) 0.1 - 1.2 mg/dL    eGFR Non African Amer 23 (L) >60 mL/min/1.73    Globulin 2.7 gm/dL    A/G Ratio 0.7 g/dL    BUN/Creatinine Ratio 6.1 (L) 7.0 - 25.0    Anion Gap 24.4 mmol/L   CBC (No Diff)    Collection Time: 03/14/18  5:49 AM   Result Value Ref Range    WBC 27.37 (H) 4.50 - 10.70 10*3/mm3    RBC 3.18 (L) 3.90 - 5.20 10*6/mm3    Hemoglobin 9.9 (L) 11.9 - 15.5 g/dL    Hematocrit 29.2 (L) 35.6 - 45.5 %    MCV 91.8 80.5 - 98.2 fL    MCH 31.1 26.9 - 32.0 pg    MCHC 33.9 32.4 - 36.3 g/dL    RDW 17.3 (H) 11.7 - 13.0 %    RDW-SD 56.2 (H) 37.0 - 54.0 fl    MPV 9.7 6.0 - 12.0 fL    Platelets 138 (L) 140 - 500 10*3/mm3   CK    Collection Time: 03/14/18  5:49 AM   Result Value Ref Range    Creatine Kinase 1,029 (H) 20 - 180 U/L   Lactic Acid, Plasma    Collection Time: 03/14/18  5:49 AM   Result Value Ref Range    Lactate 8.8 (C) 0.5 - 2.0 mmol/L   Hemoglobin A1c    Collection Time: 03/14/18  5:49 AM   Result Value Ref Range    Hemoglobin A1C 5.20 4.80 - 5.60 %   Vancomycin, Random    Collection Time: 03/14/18   5:49 AM   Result Value Ref Range    Vancomycin Random 22.20 5.00 - 40.00 mcg/mL   Protime-INR    Collection Time: 03/14/18  5:49 AM   Result Value Ref Range    Protime 29.9 (H) 11.7 - 14.2 Seconds    INR 2.90 (H) 0.90 - 1.10   POC Glucose Once    Collection Time: 03/14/18  7:22 AM   Result Value Ref Range    Glucose 88 70 - 130 mg/dL   POC Glucose Once    Collection Time: 03/14/18 11:18 AM   Result Value Ref Range    Glucose 90 70 - 130 mg/dL       Radiology:  Imaging Results (last 24 hours)     Procedure Component Value Units Date/Time    XR Chest 1 View [[id number]] Collected:  03/14/18 0413     Updated:  03/14/18 0417    Narrative:       PORTABLE CHEST X-RAY     CLINICAL HISTORY: ET tube; E16.2-Hypoglycemia, unspecified;  A41.9-Sepsis, unspecified organism; R65.21-Severe sepsis with septic  shock; N28.9-Disorder of kidney and ureter, unspecified; K72.00-Acute  and subacute hepatic failure without coma; R41.82-Altered mental status,  unspecified; R79.1-Abnormal coagulation profile     COMPARISON: March 13, 2018.     FINDINGS: Portable AP view of the chest was obtained with overlying  monitor leads in place. ET tube has been advanced now residing at the  level of the lower thoracic trachea. Right IJ central line is unchanged.  Lungs are under aerated. There are increasing perihilar predominant  groundglass opacities felt to reflect worsening edema. ARDS or  Superimposed pneumonia not entirely excluded. Normal heart size. No  significant pleural fluid.             Impression:       Under aeration with increasing predominantly groundglass  opacities favored to reflect edema        This report was finalized on 3/14/2018 4:14 AM by Mitchell Olsen MD.       CT Head Without Contrast [[id number]] Collected:  03/13/18 2037     Updated:  03/13/18 2053    Narrative:       CT HEAD WO CONTRAST-     INDICATIONS: neurologic deficit     TECHNIQUE: Radiation dose reduction techniques were utilized, including  automated exposure  control and exposure modulation based on body size.      Noncontrast head CT     COMPARISON: 3/12/2018     FINDINGS:         Compared with the prior exam, the ventricles and sulci appear  decreased, and gray-white matter differentiation is less distinct, may  be evidence of developing diffuse brain edema.     No acute intracranial hemorrhage, midline shift or mass effect.   Encephalomalacia/old infarct changes of the left cerebellum is noted.     Mild periventricular hypodensities suggest chronic small vessel ischemic  change in a patient this age.                 Bilateral mastoid air cells are partly opacified at the may reflect  mastoiditis. The visualized paranasal sinuses, orbits, mastoid air cells  are otherwise unremarkable.                   Impression:              No acute intracranial hemorrhage . Compared with the prior exam, the  ventricles and sulci appear decreased, and gray-white matter  differentiation is less distinct, may be evidence of developing diffuse  brain edema. Clinical correlation and continued follow-up recommended.  If not contraindicated, consider MRI for further evaluation.     Discussed by telephone with patient's nurse, Javier,  time of  interpretation, 2040 6/17/2018.     This report was finalized on 3/13/2018 8:50 PM by Dr. Clayton Chavarria MD.                Medications have been reviewed:  Current Facility-Administered Medications   Medication Dose Route Frequency Provider Last Rate Last Dose   • acetaminophen (TYLENOL) tablet 650 mg  650 mg Oral Q4H PRN Ismael Ibarra MD        Or   • acetaminophen (TYLENOL) 160 MG/5ML solution 650 mg  650 mg Oral Q4H PRN Ismael Ibarra MD        Or   • acetaminophen (TYLENOL) suppository 650 mg  650 mg Rectal Q4H PRN Ismael Ibarra MD       • calcium gluconate 1 g in sodium chloride 0.9 % 50 mL IVPB  1 g Intravenous PRN Dahlia Albert MD        Or   • calcium gluconate 2 g in sodium chloride 0.9 % 50 mL IVPB  2 g Intravenous PRN Dahlia SHANKS  MD [place]       • dextrose (D50W) solution 25 g  25 g Intravenous Q15 Min PRN Sudhakar Rojas MD   25 g at 03/14/18 0401   • dextrose (GLUTOSE) oral gel 15 g  15 g Oral Q15 Min PRN Sudhakar Rojas MD       • diphenoxylate-atropine (LOMOTIL) 2.5-0.025 MG per tablet 1 tablet  1 tablet Oral Q2H PRN Ismael Ibarra MD       • glucagon (human recombinant) (GLUCAGEN DIAGNOSTIC) injection 1 mg  1 mg Subcutaneous PRN Sudhakar Rojas MD       • Glycerin-Hypromellose- (ARTIFICIAL TEARS) 0.2-0.2-1 % ophthalmic solution solution 1 drop  1 drop Both Eyes Q30 Min PRN Ismael Ibarra MD       • heparin (porcine) injection 1,000-2,000 Units  1,000-2,000 Units Intravenous PRN Dahlia Albert MD   3,000 Units at 03/13/18 1936   • HYDROmorphone (DILAUDID) injection 2 mg  2 mg Intravenous Q1H PRMAGALI Ibarra MD       • LORazepam (ATIVAN) injection 0.5 mg  0.5 mg Intravenous Q1H PRN Ismael Ibarra MD        Or   • LORazepam (ATIVAN) 2 MG/ML concentrated solution 0.5 mg  0.5 mg Sublingual Q1H PRMAGALI Ibarra MD       • LORazepam (ATIVAN) 2 MG/ML concentrated solution 2 mg  2 mg Sublingual Q1H PRMAGALI Ibarra MD       • LORazepam (ATIVAN) injection 1 mg  1 mg Intravenous Q1H PRMAGALI Ibarra MD       • LORazepam (ATIVAN) injection 2 mg  2 mg Intravenous Q1H PRMAGALI Ibarra MD   2 mg at 03/13/18 1735   • magnesium sulfate in D5W 1g/100mL (PREMIX)  2 g Intravenous PRN Dahlia Albert MD       • Pharmacy to dose vancomycin   Does not apply Continuous PRN Ismael Ibarra MD       • potassium chloride 20 mEq in 50 mL IVPB  20 mEq Intravenous PRN Dahlia Albert MD 50 mL/hr at 03/13/18 1448 20 mEq at 03/13/18 1448   • remifentanil (ULTIVA) 50 mcg/mL in sodium chloride 0.9 % 100 mL  0.5-15 mcg/kg/hr (Ideal) Intravenous Titrated Ismael Ibarra MD 6.29 mL/hr at 03/14/18 1017 6 mcg/kg/hr at 03/14/18 1017   • sodium chloride 0.9 % flush 10 mL  10 mL Intracatheter PRN Ismael Ibarra MD       • sodium chloride 0.9 % flush 10 mL  10 mL  Intracatheter PRN Ismael Ibarra MD       • sodium chloride 0.9 % flush 10 mL  10 mL Intracatheter PRN Ismael Ibarra MD       • sodium chloride 0.9 % flush 10 mL  10 mL Intracatheter PRMAGALI Ibarra MD       • sodium chloride 0.9 % flush 10 mL  10 mL Intracatheter PRMAGALI Ibarra MD       • sodium chloride 0.9 % infusion 100 mL  100 mL Intravenous PRN Dahlia Albert MD       • sodium phosphates 10 mmol in sodium chloride 0.9 % 100 mL IVPB  10 mmol Intravenous PRN Dahlia Albert MD 25 mL/hr at 03/13/18 1610 10 mmol at 03/13/18 1610   • vasopressin (PITRESSIN) 20 Units in sodium chloride 0.9 % 100 mL (0.2 Units/mL) infusion  0.03 Units/min Intravenous Continuous PRN Sandor Whiting MD           Assessment/Plan     Active Problems:    Hypoglycemia    Elevated LFTs    Shock      Assessment:  (Assessment:  1.  Acute renal failure, likely due to severe ATN from shock, sepsis.  Extremely low urine output, remains dialysis dependent.  No evidence of obstruction on imaging  High anion gap metabolic acidosis secondary to lactic acidosis from sepsis and shock liver, improved with dialysis but a bit worse today  Elevated liver enzymes, shock liver versus Tylenol overdose, levels decreasing  Septic shock on IV fluids and antibiotics empirically  Hypertension from sepsis/shock  Hypocalcemia due to renal failure, replacing IV  Hypokalemia, should correct with a 4K bath on dialysis, continue to monitor serial labs  Hypernatremia on hypotonic fluids  Acute hypoxemic respiratory failure on ventilator support      Plan:  She remains dialysis dependent, family is decided on no further dialysis  Very poor prognosis given suspicion for Tylenol-induced liver toxicity  Extensive discussion with family, they wished for no further dialysis, they wish to stop pressors and any IV drips and terminally extubate  Agree that her condition is not futile but she would certainly have a very long road ahead of her and the family states that  would not be her wishes  Family wishes no further dialysis or aggressive life support  They wish to withdraw all supportive care at this time and make patient DNR  Discussed with patient's family at length, Dr. Wolff, RN).             Continue to monitor renal function, electroytes and volume closely   Please call me with any questions or concerns      Javed Cifuentes MD   [facility]   [phone]    03/14/18  11:51 AM      Dictation performed using Dragon dictation software

## 2018-03-14 NOTE — NURSING NOTE
Transitioned to comfort measures ONLY after long conference with family. Terminally extubated at 1215. Family at bedside.

## 2018-03-14 NOTE — PROGRESS NOTES
"      [facility]         Dr Guevara Sayied   LOS: 2 days   Patient Care Team:  Aura Turcios MD as PCP - General  Aura Turcios MD as PCP - Family Medicine  Pratik Hung MD as PCP - Claims Attributed    Chief Complaint: Multiorgan system failure in the setting of sepsis versus acute liver injury.  Severe metabolic acidosis with underlying renal failure.    Interval History: Events noted chart reviewed.  She remains tachypneic on the vent.  Multiple lines including right femoral, A-line and dialysis catheter right IJ.  Patient is  intubated.  She is not following commands for me.    REVIEW OF SYSTEMS:   Intubated and confused.  Not following commands.    Ventilator/Non-Invasive Ventilation Settings     Start     Ordered    03/12/18 1936  Ventilator - AC/PC; (24); 100; 5; 18  Continuous     Question Answer Comment   Vent Mode AC/PC    Breath rate  24   FiO2 50    PEEP 5    Inspiratory Pressure 18        03/12/18 1937            Vital Signs  Temp:  [97.6 °F (36.4 °C)-100.2 °F (37.9 °C)] 100.2 °F (37.9 °C)  Heart Rate:  [109-140] 137  Resp:  [36-46] 44  BP: ()/(27-79) 120/57  Arterial Line BP: ()/(39-97) 144/56  FiO2 (%):  [40 %-60 %] 60 %    Intake/Output Summary (Last 24 hours) at 03/14/18 0915  Last data filed at 03/14/18 0528   Gross per 24 hour   Intake          5030.33 ml   Output             1066 ml   Net          3964.33 ml     Flowsheet Rows    Flowsheet Row First Filed Value   Admission Height 162.6 cm (64\") Documented at 03/12/2018 1049   Admission Weight 81.6 kg (180 lb) Documented at 03/12/2018 1049          Physical Exam:   General Appearance:    Intubated ET tube good position orally.  Tachypneic on the vent.  Not following commands for me.  Multiple drips as noted above.     Lungs:     Diminished breath sounds with rhonchi and crackles bilaterally.      Heart:    Tachycardic on the monitor.     Chest Wall:    No abnormalities observed   Abdomen:    Soft no masses felt.  No " significant tenderness rebound or guarding.     Extremities:    no edema, no cyanosis, no             redness  CNS not following commands.  Pupils sluggish but reactive to light.       Results Review:          Results from last 7 days  Lab Units 03/14/18  0549 03/13/18  2331 03/13/18  1802   SODIUM mmol/L 141 142 144   POTASSIUM mmol/L 3.8 3.8 3.8   CHLORIDE mmol/L 98 98 99   CO2 mmol/L 18.6* 19.0* 20.4*   BUN mg/dL 13 11 9   CREATININE mg/dL 2.13* 1.81* 1.53*   GLUCOSE mg/dL 113* 84 96   CALCIUM mg/dL 8.0* 8.4* 7.4*       Results from last 7 days  Lab Units 03/14/18  0549 03/13/18  0806 03/12/18  1117   CK TOTAL U/L 1,029* 6,654* 1,807*       Results from last 7 days  Lab Units 03/14/18  0549 03/13/18  0607 03/12/18  1117   WBC 10*3/mm3 27.37* 31.87* 26.97*   HEMOGLOBIN g/dL 9.9* 10.9* 12.3   HEMATOCRIT % 29.2* 32.5* 37.6   PLATELETS 10*3/mm3 138* 227 451       Results from last 7 days  Lab Units 03/14/18  0549 03/13/18  0607 03/12/18  2132   INR  2.90* 3.09* 3.28*           Results from last 7 days  Lab Units 03/14/18  0549   MAGNESIUM mg/dL 1.7           Results from last 7 days  Lab Units 03/14/18  0304   PH, ARTERIAL pH units 7.464*   PO2 ART mm Hg 92.1   PCO2, ARTERIAL mm Hg 27.4*   HCO3 ART mmol/L 19.6*       I reviewed the patient's new clinical results.  I personally viewed and interpreted the patient's CXR        Medication Review:     acetylcysteine 150 mg/kg Intravenous Daily   cefepime 2 g Intravenous Q24H   famotidine 20 mg Intravenous Daily   insulin aspart 0-7 Units Subcutaneous Q4H   sodium chloride 10 mL Intracatheter Q12H   Vancomycin Pharmacy Intermittent Dosing  Does not apply Daily         dextrose 5 % and sodium chloride 0.45 % 75 mL/hr Last Rate: 75 mL/hr (03/14/18 0419)   norepinephrine 0.02-0.3 mcg/kg/min Last Rate: 0.2 mcg/kg/min (03/14/18 4788)   Pharmacy to dose vancomycin     phenylephrine 0.5-3 mcg/kg/min Last Rate: Stopped (03/12/18 8600)   propofol 5-50 mcg/kg/min Last Rate: 20  mcg/kg/min (03/14/18 0815)   remifentanil (ULTIVA) infusion 0.5-15 mcg/kg/hr (Ideal) Last Rate: 6 mcg/kg/hr (03/14/18 0614)   sodium chloride 9 mL/hr Last Rate: 9 mL/hr (03/13/18 1852)   vasopressin 0.03 Units/min        ASSESSMENT:   Shock likely septic and possibly distributive  Multiorgan system failure  Acute respiratory failure  Bilateral pneumonia/ARDS  Initial severe metabolic acidosis now respiratory and metabolic alkalosis  Acute liver injury/shock liver versus acute Tylenol toxicity  Coagulopathy  Acute renal failure  Rhabdomyolysis  Severe lactic acidosis  History of CVA  Possible anoxia  Multiple electrolyte abnormalities      PLAN:  Etiology of shock septic plus possibly distributive due to acute liver injury  Initial severe metabolic acidosis now alkalosis combined respiratory with alkalosis.  ABGs have improved from yesterday.  Some of the tachypnea could be due to central breathing.  CT head with diffuse edema noted.  Antibiotics per infectious diseases.  Discussed with ID and appreciated their input.  CT chest and abdomen pelvis was reviewed.  Evidence of pneumonia/ARDS noted.  CK improving with IV fluids.  Renal currently managing IV fluids.  Coagulopathy improved with vitamin K and FFP.  No signs of overt bleeding.  Drop in hemoglobin noted.  We will continue to monitor  Mucomyst per GI.  Continue supportive care.  Updated patient's son at bedside.  Overall little change clinically from yesterday.  Mental status has deteriorated but could be due to multiple etiologies.  CT head noted with diffuse edema possible anoxia.  Son is agreeable to proceed with neurological consult and we'll check stat ammonia level also.  We will await  to arrive and decide on goals of care.  Continue supportive care until family decides on goals of care.  Her overall prognosis remains extremely poor due to multiorgan system failure    Ismael Ibarra MD  03/14/18  9:15 AM    Critical care time 35 minutes

## 2018-03-14 NOTE — PLAN OF CARE
Problem: Patient Care Overview  Goal: Plan of Care Review  Outcome: Ongoing (interventions implemented as appropriate)   03/14/18 0516   Coping/Psychosocial   Plan of Care Reviewed With family   Plan of Care Review   Progress declining   OTHER   Outcome Summary Pt remains in CCU, intubated, on Levophed. Propofol/Remifent titrated down to assess neuro status without any changes. Family wishes to withdraw care today if no improvements are made. Will continue to monitor.       Problem: Fall Risk (Adult)  Goal: Absence of Fall  Outcome: Ongoing (interventions implemented as appropriate)      Problem: Skin Injury Risk (Adult)  Goal: Skin Health and Integrity  Outcome: Ongoing (interventions implemented as appropriate)      Problem: Sepsis/Septic Shock (Adult)  Goal: Signs and Symptoms of Listed Potential Problems Will be Absent, Minimized or Managed (Sepsis/Septic Shock)  Outcome: Ongoing (interventions implemented as appropriate)      Problem: Renal Replacement, Continuous (Adult)  Goal: Signs and Symptoms of Listed Potential Problems Will be Absent, Minimized or Managed (Renal Replacement, Continuous)  Outcome: Ongoing (interventions implemented as appropriate)      Problem: Ventilation, Mechanical Invasive (Adult)  Goal: Signs and Symptoms of Listed Potential Problems Will be Absent, Minimized or Managed (Ventilation, Mechanical Invasive)  Outcome: Ongoing (interventions implemented as appropriate)      Problem: Liver Failure, Acute/Chronic (Adult)  Goal: Signs and Symptoms of Listed Potential Problems Will be Absent, Minimized or Managed (Liver Failure, Acute/Chronic)  Outcome: Ongoing (interventions implemented as appropriate)      Problem: Tissue Perfusion, Ineffective Peripheral (Adult)  Goal: Adequate Tissue Perfusion  Outcome: Ongoing (interventions implemented as appropriate)

## 2018-03-14 NOTE — PROGRESS NOTES
Chart reviewed. Spoke with primary team. Patient transitioning to palliative care which is appropriate. Thank you for allowing me to be involved in the care of Mrs. Barber.

## 2018-03-14 NOTE — PROGRESS NOTES
LOS: 2 days   Patient Care Team:  Aura Turcios MD as PCP - General  Aura Turcios MD as PCP - Family Medicine  Pratik Hung MD as PCP - Claims Attributed    Chief Complaint: Probable septic shock with acute liver injury.       Interval History: She remains critically ill with multi-organs system failure.  She is non-responsive.      Objective   Vital Signs  Temp:  [97.6 °F (36.4 °C)-100.2 °F (37.9 °C)] 100.2 °F (37.9 °C)  Heart Rate:  [110-140] 137  Resp:  [36-46] 44  BP: ()/(27-79) 120/57  Arterial Line BP: ()/(39-97) 144/56  FiO2 (%):  [40 %-60 %] 60 %    Intake/Output Summary (Last 24 hours) at 03/14/18 1006  Last data filed at 03/14/18 0528   Gross per 24 hour   Intake          4980.33 ml   Output             1066 ml   Net          3914.33 ml       Intubated, sedated.  PERRL, conjunctiva clear  Neck supple, no JVD or thyromegaly appreciated  S1/S2 RRR, no m/r/g  Lungs CTA B, normal effort  Abdomen S/NT/ND (+) BS, no HSM appreciated  Extremities warm, no clubbing, cyanosis, or edema  No visible or palpable skin lesions  Non-responsive.    Results Review:        Results from last 7 days  Lab Units 03/14/18  0549 03/13/18  2331 03/13/18  1802   SODIUM mmol/L 141 142 144   POTASSIUM mmol/L 3.8 3.8 3.8   CHLORIDE mmol/L 98 98 99   CO2 mmol/L 18.6* 19.0* 20.4*   BUN mg/dL 13 11 9   CREATININE mg/dL 2.13* 1.81* 1.53*   GLUCOSE mg/dL 113* 84 96   CALCIUM mg/dL 8.0* 8.4* 7.4*       Results from last 7 days  Lab Units 03/14/18  0549 03/13/18  0806 03/12/18  1117   CK TOTAL U/L 1,029* 6,654* 1,807*       Results from last 7 days  Lab Units 03/14/18  0549 03/13/18  0607 03/12/18  1117   WBC 10*3/mm3 27.37* 31.87* 26.97*   HEMOGLOBIN g/dL 9.9* 10.9* 12.3   HEMATOCRIT % 29.2* 32.5* 37.6   PLATELETS 10*3/mm3 138* 227 451       Results from last 7 days  Lab Units 03/14/18  0549 03/13/18  0607 03/12/18  2132   INR  2.90* 3.09* 3.28*           Results from last 7 days  Lab Units 03/14/18  0549   MAGNESIUM  mg/dL 1.7           I reviewed the patient's new clinical results.  I personally viewed and interpreted the patient's EKG/Telemetry data        Medication Review:     acetylcysteine 150 mg/kg Intravenous Daily   cefepime 2 g Intravenous Q24H   famotidine 20 mg Intravenous Daily   insulin aspart 0-7 Units Subcutaneous Q4H   sodium chloride 10 mL Intracatheter Q12H   Vancomycin Pharmacy Intermittent Dosing  Does not apply Daily         dextrose 5 % and sodium chloride 0.45 % 75 mL/hr Last Rate: 75 mL/hr (03/14/18 2949)   norepinephrine 0.02-0.3 mcg/kg/min Last Rate: 0.2 mcg/kg/min (03/14/18 1263)   Pharmacy to dose vancomycin     phenylephrine 0.5-3 mcg/kg/min Last Rate: Stopped (03/12/18 3433)   propofol 5-50 mcg/kg/min Last Rate: 20 mcg/kg/min (03/14/18 4715)   remifentanil (ULTIVA) infusion 0.5-15 mcg/kg/hr (Ideal) Last Rate: 6 mcg/kg/hr (03/14/18 9614)   sodium chloride 9 mL/hr Last Rate: 9 mL/hr (03/13/18 8282)   vasopressin 0.03 Units/min        Assessment/Plan     Active Problems:    Hypoglycemia    Elevated LFTs    Shock    Acute liver injury, probable septic shock.  Staph species in blood culture, probable contaminant.    I don't see any improvement, possibly slight decline.  Though I don't think her treatment is futile at this time, her family strongly states that she would not want even this level of care.    I will talk with  today about goals of care.    Pratik Hung MD  03/14/18  10:06 AM

## 2018-03-14 NOTE — PROGRESS NOTES
[facility]  Inpatient Progress Note    Reason for Follow Up: Acute liver failure, Multiorgan failure     Subjective     Interval History: Discussed with RN, dialysis stopped, patient minimally responsive.  Transaminase levels showed slight improvement, bilirubin elevated and coagulopathy persist.  Etiology of acute liver failure still not well defined.      Current Facility-Administered Medications:   •  acetylcysteine (ACETADOTE) 12,240 mg in dextrose (D5W) 5 % 200 mL infusion, 150 mg/kg, Intravenous, Daily, Aydee Cade MD, 12,240 mg at 03/13/18 1612  •  calcium gluconate 1 g in sodium chloride 0.9 % 50 mL IVPB, 1 g, Intravenous, PRN **OR** calcium gluconate 2 g in sodium chloride 0.9 % 50 mL IVPB, 2 g, Intravenous, PRN, Dahlia Albert MD  •  cefepime (MAXIPIME) 2 g/100 mL 0.9% NS (mbp), 2 g, Intravenous, Q24H, Kunal Conklin MD  •  dextrose (D50W) solution 25 g, 25 g, Intravenous, Q15 Min PRN, Sudhakar Rojas MD, 25 g at 03/14/18 0401  •  dextrose (GLUTOSE) oral gel 15 g, 15 g, Oral, Q15 Min PRN, Sudhakar Rojas MD  •  dextrose 5 % and sodium chloride 0.45 % infusion, 75 mL/hr, Intravenous, Continuous, Sudhakar Rojas MD, Last Rate: 75 mL/hr at 03/14/18 0419, 75 mL/hr at 03/14/18 0419  •  famotidine (PEPCID) injection 20 mg, 20 mg, Intravenous, Daily, Ismael Ibarra MD, 20 mg at 03/13/18 0828  •  glucagon (human recombinant) (GLUCAGEN DIAGNOSTIC) injection 1 mg, 1 mg, Subcutaneous, PRN, Sudhakar Rojas MD  •  heparin (porcine) injection 1,000-2,000 Units, 1,000-2,000 Units, Intravenous, PRN, Dahlia Albert MD, 3,000 Units at 03/13/18 1936  •  insulin aspart (novoLOG) injection 0-7 Units, 0-7 Units, Subcutaneous, Q4H, Sudhakar Rojas MD, 4 Units at 03/13/18 0330  •  LORazepam (ATIVAN) injection 2 mg, 2 mg, Intravenous, Q1H PRN, Ismael Ibarra MD, 2 mg at 03/13/18 1735  •  magnesium sulfate in D5W 1g/100mL (PREMIX), 2 g, Intravenous, PRN, Dahlia Albert MD  •  norepinephrine (LEVOPHED) 8  mg/250 mL (32 mcg/mL) in sodium chloride 0.9% infusion (premix), 0.02-0.3 mcg/kg/min, Intravenous, Titrated, Ismael Ibarra MD, Last Rate: 35.6 mL/hr at 03/14/18 0725, 0.2 mcg/kg/min at 03/14/18 0725  •  Pharmacy to dose vancomycin, , Does not apply, Continuous PRN, Ismael Ibarra MD  •  phenylephrine (CAROL-SYNEPHRINE) 50 mg/250 mL (0.2 mg/mL) in 0.9% NS  infusion, 0.5-3 mcg/kg/min, Intravenous, Titrated, Sandor Whiting MD, Stopped at 03/12/18 2133  •  potassium chloride 20 mEq in 50 mL IVPB, 20 mEq, Intravenous, PRN, Dahlia Albert MD, Last Rate: 50 mL/hr at 03/13/18 1448, 20 mEq at 03/13/18 1448  •  propofol (DIPRIVAN) infusion 10 mg/mL 100 mL, 5-50 mcg/kg/min, Intravenous, Titrated, Ismael Ibarra MD, Last Rate: 11.39 mL/hr at 03/14/18 0815, 20 mcg/kg/min at 03/14/18 0815  •  remifentanil (ULTIVA) 50 mcg/mL in sodium chloride 0.9 % 100 mL, 0.5-15 mcg/kg/hr (Ideal), Intravenous, Titrated, Ismael Ibarra MD, Last Rate: 6.29 mL/hr at 03/14/18 0614, 6 mcg/kg/hr at 03/14/18 0614  •  sodium chloride 0.9 % flush 10 mL, 10 mL, Intracatheter, Q12H, Ismael Ibarra MD, 10 mL at 03/14/18 0814  •  sodium chloride 0.9 % flush 10 mL, 10 mL, Intracatheter, PRN, Ismael Ibarra MD  •  sodium chloride 0.9 % flush 10 mL, 10 mL, Intracatheter, PRN, Ismael Ibarra MD  •  sodium chloride 0.9 % flush 10 mL, 10 mL, Intracatheter, PRNIsmael MD  •  sodium chloride 0.9 % flush 10 mL, 10 mL, Intracatheter, PRN, Ismael Ibarra MD  •  sodium chloride 0.9 % flush 10 mL, 10 mL, Intracatheter, PRN, Ismael Ibarra MD  •  sodium chloride 0.9 % infusion 100 mL, 100 mL, Intravenous, PRN, Dahlia Albert MD  •  sodium chloride 0.9 % infusion, 9 mL/hr, Intravenous, Continuous, Sudhakar Rojas MD, Last Rate: 9 mL/hr at 03/13/18 1852, 9 mL/hr at 03/13/18 1852  •  sodium phosphates 10 mmol in sodium chloride 0.9 % 100 mL IVPB, 10 mmol, Intravenous, PRN, Dahlia Albert MD, Last Rate: 25 mL/hr at 03/13/18 1610, 10 mmol at 03/13/18 1610  •   Vancomycin Pharmacy Intermittent Dosing, , Does not apply, Daily, Luis Armando Castro MD  •  vasopressin (PITRESSIN) 20 Units in sodium chloride 0.9 % 100 mL (0.2 Units/mL) infusion, 0.03 Units/min, Intravenous, Continuous PRN, Sandor Whiting MD  Review of Systems:    Review of systems could not be obtained due to  patient unresponsive.    Objective     Vital Signs  Temp:  [97.6 °F (36.4 °C)-100.2 °F (37.9 °C)] 100.2 °F (37.9 °C)  Heart Rate:  [109-140] 137  Resp:  [36-46] 44  BP: ()/(27-79) 120/57  Arterial Line BP: ()/(39-97) 144/56  FiO2 (%):  [40 %-60 %] 60 %  Body mass index is 39.02 kg/m².    Intake/Output Summary (Last 24 hours) at 03/14/18 0830  Last data filed at 03/14/18 0528   Gross per 24 hour   Intake          5289.33 ml   Output             1080 ml   Net          4209.33 ml     No intake/output data recorded.     Physical Exam:   General: patient Unresponsive on ventilator   Eyes: Normal lids and lashes, scleral icterus   Neck: supple, normal ROM   Skin: warm and dry, not jaundiced   Cardiovascular: regular rhythm and rate, no murmurs auscultated   Pulm: clear to auscultation bilaterally, regular and unlabored   Abdomen: soft, nontender, nondistended; normal bowel sounds   Rectal: deferred   Extremities: no rash or edema   Psychiatric: Unable to assess     Results Review:     I reviewed the patient's new clinical results.      Results from last 7 days  Lab Units 03/14/18  0549 03/13/18  0607 03/12/18  1117   WBC 10*3/mm3 27.37* 31.87* 26.97*   HEMOGLOBIN g/dL 9.9* 10.9* 12.3   HEMATOCRIT % 29.2* 32.5* 37.6   PLATELETS 10*3/mm3 138* 227 451       Results from last 7 days  Lab Units 03/14/18  0549 03/13/18  2331 03/13/18  1802 03/13/18  1618  03/13/18  0806  03/12/18  1658   SODIUM mmol/L 141 142 144  --   < > 146*  < > 149*   POTASSIUM mmol/L 3.8 3.8 3.8  --   < > 2.8*  < > 3.6   CHLORIDE mmol/L 98 98 99  --   < > 96*  < > 107   CO2 mmol/L 18.6* 19.0* 20.4*  --   < > 23.9  < > 10.2*    BUN mg/dL 13 11 9  --   < > 14  < > 21   CREATININE mg/dL 2.13* 1.81* 1.53*  --   < > 2.33*  < > 4.05*   CALCIUM mg/dL 8.0* 8.4* 7.4*  --   < > 6.8*  < > 6.0*   BILIRUBIN mg/dL 8.1*  --   --   --   --  5.5*  --  3.5*   ALK PHOS U/L 255*  --   --   --   --  267*  --  210*   ALT (SGPT) U/L 1,682*  --   --  2,319*  --  2,870*  --  3,690*   AST (SGOT) U/L 774*  --   --  1,869*  --  3,024*  --  5,612*   GLUCOSE mg/dL 113* 84 96  --   < > 138*  < > 143*   < > = values in this interval not displayed.    Results from last 7 days  Lab Units 03/14/18  0549 03/13/18  0607 03/12/18  2132   INR  2.90* 3.09* 3.28*       Lab Results  Lab Value Date/Time   LIPASE 36 06/27/2014 1923       Radiology:  XR Chest 1 View   Final Result   Under aeration with increasing predominantly groundglass   opacities favored to reflect edema           This report was finalized on 3/14/2018 4:14 AM by Mitchell Olsen MD.          CT Head Without Contrast   Final Result           No acute intracranial hemorrhage . Compared with the prior exam, the   ventricles and sulci appear decreased, and gray-white matter   differentiation is less distinct, may be evidence of developing diffuse   brain edema. Clinical correlation and continued follow-up recommended.   If not contraindicated, consider MRI for further evaluation.       Discussed by telephone with patient's nurse, Javier,  time of   interpretation, 2040 6/17/2018.       This report was finalized on 3/13/2018 8:50 PM by Dr. Clayton Chavarria MD.          CT Head Without Contrast   Final Result       1. Exam is slightly compromised by patient motion artifact even despite   repeating the CT through the head.   2. There is mild small vessel disease in the frontal periventricular   white matter. Areas of blurring of the gray-white differentiation in the   temporal parietal occipital region bilaterally is likely artifact.   3. Moderate size area of encephalomalacia, most consistent with an old    infarct throughout the inferior left cerebellum measuring 4.8 x 3.7 cm   in size in the distribution of the inferior cerebellar branches of the   left PICA territory. The remainder of the head CT is within normal   limits. The results were communicated to Dr. Johnson in the emergency   room by telephone 03/12/2018 at 3 PM.        Radiation dose reduction techniques were utilized, including automated   exposure control and exposure modulation based on body size.       This report was finalized on 3/13/2018 8:24 AM by Dr. Javier Marroquin MD.          XR Chest Post CVA Port   Final Result   1. Life support line changes as above without pneumothorax           This report was finalized on 3/13/2018 1:15 AM by Mitchell Olsen MD.          CT Chest Without Contrast   Final Result   1. Extensive bilateral groundglass opacities with superimposed patchy   airspace disease both upper and lower lobes. This may represent   interstitial and patchy alveolar pulmonary edema though ARDS or   infiltrate superimposed on interstitial edema or infiltrates are also   considerations. Trace pleural fluid is present.   2. Cardiomegaly. Foramen ovale closure device is present.   3. Gastric band is present. Cholecystectomy clips.   4. Moderate gastric distention with air and fluid. Mild colonic   distention without evidence for bowel obstruction or ascites or abscess   formation.    5. Right common femoral venous catheter tip in the right external iliac   vein. Hayes catheter is in a decompressed urinary bladder.        Radiation dose reduction techniques were utilized, including automated   exposure control and exposure modulation based on body size.       This report was finalized on 3/12/2018 7:08 PM by Dr. Roberto Rico MD.          CT Abdomen Pelvis Without Contrast   Final Result   1. Extensive bilateral groundglass opacities with superimposed patchy   airspace disease both upper and lower lobes. This may represent   interstitial  and patchy alveolar pulmonary edema though ARDS or   infiltrate superimposed on interstitial edema or infiltrates are also   considerations. Trace pleural fluid is present.   2. Cardiomegaly. Foramen ovale closure device is present.   3. Gastric band is present. Cholecystectomy clips.   4. Moderate gastric distention with air and fluid. Mild colonic   distention without evidence for bowel obstruction or ascites or abscess   formation.    5. Right common femoral venous catheter tip in the right external iliac   vein. Hayes catheter is in a decompressed urinary bladder.        Radiation dose reduction techniques were utilized, including automated   exposure control and exposure modulation based on body size.       This report was finalized on 3/12/2018 7:08 PM by Dr. Roberto Rico MD.          XR Chest Post CVA Port   Final Result       Endotracheal tube tip extends into the proximal right mainstem bronchus,   advise partially retracting the tube. Bilateral alveolar interstitial   infiltrates represent interval worsening.       Discussed by telephone with patient's nurse, Catherine, at time of   interpretation, 1700, 3/12/2018.           This report was finalized on 3/12/2018 5:00 PM by Dr. Clayton Chavarria MD.          XR Chest 1 View   Final Result   No focal pulmonary consolidation. Borderline heart size,   with mild prominence of interstitial markings. Follow-up as indications   persist.       This report was finalized on 3/12/2018 12:05 PM by Dr. Clayton Chavarria MD.              Assessment/Plan     Patient Active Problem List   Diagnosis   • Abnormal finding in urine   • Bunion   • Dyslipidemia   • Edema   • Fatigue   • Headache   • Hyperglycemia   • Osteoarthritis of knee   • Knee pain   • Nausea   • Patent foramen ovale   • Postoperative complication   • Psoriasis   • Cerebrovascular accident   • Lower urinary tract infectious disease   • Cobalamin deficiency   • Vitamin D deficiency   • Winter itch    • Hypoglycemia   • Elevated LFTs   • Shock   Impression  #1 acute liver failure  #2 coagulopathy  #3 sepsis  #4 respiratory failure      Recommendation  Discussed with family at bedside, decision to be made regarding palliative care.  Continue supportive measures  Monitor LFTs      I discussed the patients findings and my recommendations with family, nursing staff and primary care team.    Travis Leonardo MD

## 2018-03-16 LAB — GLUCOSE BLDC GLUCOMTR-MCNC: 108 MG/DL (ref 70–130)

## 2018-03-17 LAB — BACTERIA SPEC AEROBE CULT: NORMAL

## 2018-03-18 LAB
BACTERIA SPEC AEROBE CULT: NORMAL
BACTERIA SPEC AEROBE CULT: NORMAL

## 2018-03-23 NOTE — DISCHARGE SUMMARY
PHYSICIAN DISCHARGE SUMMARY                                                                        [facility]    Patient Identification:  Name: Jess Barber  Age: 72 y.o.  Sex: female  :  1945  MRN: [id number]  Primary Care Physician: Aura Turcios MD    Admit date: 3/12/2018  Discharge date and time: 3/14/2018  1:36 PM   Discharged Condition:     Discharge Diagnoses:Active Problems:    Hypoglycemia    Elevated LFTs    Shock   Shock likely septic and possibly distributive  Multiorgan system failure  Acute respiratory failure  Bilateral pneumonia/ARDS  Initial severe metabolic acidosis now respiratory and metabolic alkalosis  Acute liver injury/shock liver versus acute Tylenol toxicity  Coagulopathy  Acute renal failure  Rhabdomyolysis  Severe lactic acidosis  History of CVA  Possible anoxia  Multiple electrolyte abnormalities    Hospital Course: Jess Barber presented to [facility]    Patient admitted with multiorgan system failure.  Despite maximum resuscitated efforts patient did not improve significantly.  Family proceeded with palliative care.  Detailed discussion with the family as documented.  Consults:   IP CONSULT TO PULMONOLOGY  IP CONSULT TO NEPHROLOGY  IP CONSULT TO PALLIATIVE CARE MD  IP CONSULT TO SPIRITUAL CARE  IP CONSULT TO INFECTIOUS DISEASES    Significant Diagnostic Studies:   [unfilled]    Discharge Exam:  s     Disposition:            Signed:  Ismael Ibarra MD  3/23/2018  9:55 AM